# Patient Record
Sex: MALE | Race: WHITE | NOT HISPANIC OR LATINO | URBAN - METROPOLITAN AREA
[De-identification: names, ages, dates, MRNs, and addresses within clinical notes are randomized per-mention and may not be internally consistent; named-entity substitution may affect disease eponyms.]

---

## 2023-03-13 DIAGNOSIS — N52.9 ERECTILE DYSFUNCTION, UNSPECIFIED ERECTILE DYSFUNCTION TYPE: Primary | ICD-10-CM

## 2023-03-13 RX ORDER — SILDENAFIL 100 MG/1
TABLET, FILM COATED ORAL
COMMUNITY
Start: 2019-02-21 | End: 2023-11-17 | Stop reason: WASHOUT

## 2023-03-13 RX ORDER — ASPIRIN 81 MG/1
1 TABLET ORAL DAILY
COMMUNITY

## 2023-03-13 RX ORDER — ROSUVASTATIN CALCIUM 40 MG/1
1 TABLET, COATED ORAL DAILY
COMMUNITY
Start: 2020-01-13 | End: 2023-09-13

## 2023-03-13 RX ORDER — SILDENAFIL 100 MG/1
100 TABLET, FILM COATED ORAL DAILY PRN
Qty: 10 TABLET | Refills: 3 | Status: CANCELLED | OUTPATIENT
Start: 2023-03-13 | End: 2023-04-12

## 2023-03-13 RX ORDER — LISINOPRIL 5 MG/1
1 TABLET ORAL DAILY
COMMUNITY
Start: 2017-10-24 | End: 2023-09-13

## 2023-03-13 RX ORDER — PREDNISONE 20 MG/1
1 TABLET ORAL DAILY
COMMUNITY
Start: 2022-12-01 | End: 2023-11-17 | Stop reason: WASHOUT

## 2023-03-13 RX ORDER — OMEPRAZOLE 40 MG/1
1 CAPSULE, DELAYED RELEASE ORAL DAILY PRN
COMMUNITY
Start: 2017-10-24 | End: 2023-09-13

## 2023-03-14 RX ORDER — SILDENAFIL 100 MG/1
TABLET, FILM COATED ORAL
Qty: 30 TABLET | Refills: 0 | Status: SHIPPED | OUTPATIENT
Start: 2023-03-14 | End: 2023-11-21

## 2023-09-12 DIAGNOSIS — I10 BENIGN ESSENTIAL HYPERTENSION: Primary | ICD-10-CM

## 2023-09-13 DIAGNOSIS — E78.5 HYPERLIPIDEMIA, UNSPECIFIED HYPERLIPIDEMIA TYPE: ICD-10-CM

## 2023-09-13 DIAGNOSIS — K21.9 GASTROESOPHAGEAL REFLUX DISEASE WITHOUT ESOPHAGITIS: Primary | ICD-10-CM

## 2023-09-13 RX ORDER — OMEPRAZOLE 40 MG/1
40 CAPSULE, DELAYED RELEASE ORAL DAILY PRN
Qty: 100 CAPSULE | Refills: 2 | Status: SHIPPED | OUTPATIENT
Start: 2023-09-13 | End: 2024-05-06 | Stop reason: WASHOUT

## 2023-09-13 RX ORDER — LISINOPRIL 5 MG/1
5 TABLET ORAL DAILY
Qty: 100 TABLET | Refills: 2 | Status: SHIPPED | OUTPATIENT
Start: 2023-09-13

## 2023-09-13 RX ORDER — ROSUVASTATIN CALCIUM 40 MG/1
40 TABLET, COATED ORAL DAILY
Qty: 100 TABLET | Refills: 2 | Status: SHIPPED | OUTPATIENT
Start: 2023-09-13 | End: 2024-05-06 | Stop reason: SDDI

## 2023-11-17 ENCOUNTER — OFFICE VISIT (OUTPATIENT)
Dept: PRIMARY CARE | Facility: CLINIC | Age: 68
End: 2023-11-17
Payer: MEDICARE

## 2023-11-17 VITALS
DIASTOLIC BLOOD PRESSURE: 82 MMHG | SYSTOLIC BLOOD PRESSURE: 118 MMHG | BODY MASS INDEX: 31.7 KG/M2 | HEART RATE: 67 BPM | WEIGHT: 214 LBS | HEIGHT: 69 IN | OXYGEN SATURATION: 97 % | TEMPERATURE: 97.8 F

## 2023-11-17 DIAGNOSIS — I49.9 IRREGULAR HEARTBEAT: ICD-10-CM

## 2023-11-17 DIAGNOSIS — J02.9 PHARYNGITIS, UNSPECIFIED ETIOLOGY: ICD-10-CM

## 2023-11-17 DIAGNOSIS — N40.0 BENIGN PROSTATIC HYPERPLASIA WITHOUT LOWER URINARY TRACT SYMPTOMS: ICD-10-CM

## 2023-11-17 DIAGNOSIS — I10 HYPERTENSION, UNSPECIFIED TYPE: Primary | ICD-10-CM

## 2023-11-17 DIAGNOSIS — E78.5 HYPERLIPIDEMIA, UNSPECIFIED HYPERLIPIDEMIA TYPE: ICD-10-CM

## 2023-11-17 LAB
ALBUMIN SERPL BCP-MCNC: 4.6 G/DL (ref 3.4–5)
ALP SERPL-CCNC: 58 U/L (ref 33–136)
ALT SERPL W P-5'-P-CCNC: 36 U/L (ref 10–52)
ANION GAP SERPL CALC-SCNC: 14 MMOL/L (ref 10–20)
AST SERPL W P-5'-P-CCNC: 21 U/L (ref 9–39)
BASOPHILS # BLD AUTO: 0.02 X10*3/UL (ref 0–0.1)
BASOPHILS NFR BLD AUTO: 0.3 %
BILIRUB SERPL-MCNC: 0.8 MG/DL (ref 0–1.2)
BUN SERPL-MCNC: 20 MG/DL (ref 6–23)
CALCIUM SERPL-MCNC: 9.6 MG/DL (ref 8.6–10.6)
CHLORIDE SERPL-SCNC: 103 MMOL/L (ref 98–107)
CHOLEST SERPL-MCNC: 170 MG/DL (ref 0–199)
CHOLESTEROL/HDL RATIO: 3.9
CO2 SERPL-SCNC: 29 MMOL/L (ref 21–32)
CREAT SERPL-MCNC: 1.12 MG/DL (ref 0.5–1.3)
EOSINOPHIL # BLD AUTO: 0.13 X10*3/UL (ref 0–0.7)
EOSINOPHIL NFR BLD AUTO: 2.2 %
ERYTHROCYTE [DISTWIDTH] IN BLOOD BY AUTOMATED COUNT: 13.5 % (ref 11.5–14.5)
GFR SERPL CREATININE-BSD FRML MDRD: 71 ML/MIN/1.73M*2
GLUCOSE SERPL-MCNC: 89 MG/DL (ref 74–99)
HCT VFR BLD AUTO: 45.8 % (ref 41–52)
HDLC SERPL-MCNC: 44 MG/DL
HGB BLD-MCNC: 14.9 G/DL (ref 13.5–17.5)
IMM GRANULOCYTES # BLD AUTO: 0.02 X10*3/UL (ref 0–0.7)
IMM GRANULOCYTES NFR BLD AUTO: 0.3 % (ref 0–0.9)
LDLC SERPL CALC-MCNC: 87 MG/DL
LYMPHOCYTES # BLD AUTO: 1.34 X10*3/UL (ref 1.2–4.8)
LYMPHOCYTES NFR BLD AUTO: 22.3 %
MCH RBC QN AUTO: 29.4 PG (ref 26–34)
MCHC RBC AUTO-ENTMCNC: 32.5 G/DL (ref 32–36)
MCV RBC AUTO: 90 FL (ref 80–100)
MONOCYTES # BLD AUTO: 0.44 X10*3/UL (ref 0.1–1)
MONOCYTES NFR BLD AUTO: 7.3 %
NEUTROPHILS # BLD AUTO: 4.07 X10*3/UL (ref 1.2–7.7)
NEUTROPHILS NFR BLD AUTO: 67.6 %
NON HDL CHOLESTEROL: 126 MG/DL (ref 0–149)
NRBC BLD-RTO: 0 /100 WBCS (ref 0–0)
PLATELET # BLD AUTO: 214 X10*3/UL (ref 150–450)
POTASSIUM SERPL-SCNC: 4.8 MMOL/L (ref 3.5–5.3)
PROT SERPL-MCNC: 7.1 G/DL (ref 6.4–8.2)
PSA SERPL-MCNC: 1.22 NG/ML
RBC # BLD AUTO: 5.07 X10*6/UL (ref 4.5–5.9)
SODIUM SERPL-SCNC: 141 MMOL/L (ref 136–145)
T4 SERPL-MCNC: 8.1 UG/DL (ref 4.5–11.1)
TRIGL SERPL-MCNC: 196 MG/DL (ref 0–149)
TSH SERPL-ACNC: 1.1 MIU/L (ref 0.44–3.98)
VLDL: 39 MG/DL (ref 0–40)
WBC # BLD AUTO: 6 X10*3/UL (ref 4.4–11.3)

## 2023-11-17 PROCEDURE — 84436 ASSAY OF TOTAL THYROXINE: CPT

## 2023-11-17 PROCEDURE — 80061 LIPID PANEL: CPT

## 2023-11-17 PROCEDURE — 1126F AMNT PAIN NOTED NONE PRSNT: CPT | Performed by: FAMILY MEDICINE

## 2023-11-17 PROCEDURE — 36415 COLL VENOUS BLD VENIPUNCTURE: CPT

## 2023-11-17 PROCEDURE — 99214 OFFICE O/P EST MOD 30 MIN: CPT | Performed by: FAMILY MEDICINE

## 2023-11-17 PROCEDURE — 3074F SYST BP LT 130 MM HG: CPT | Performed by: FAMILY MEDICINE

## 2023-11-17 PROCEDURE — 84153 ASSAY OF PSA TOTAL: CPT

## 2023-11-17 PROCEDURE — 3079F DIAST BP 80-89 MM HG: CPT | Performed by: FAMILY MEDICINE

## 2023-11-17 PROCEDURE — 1160F RVW MEDS BY RX/DR IN RCRD: CPT | Performed by: FAMILY MEDICINE

## 2023-11-17 PROCEDURE — 84443 ASSAY THYROID STIM HORMONE: CPT

## 2023-11-17 PROCEDURE — 1159F MED LIST DOCD IN RCRD: CPT | Performed by: FAMILY MEDICINE

## 2023-11-17 PROCEDURE — 85025 COMPLETE CBC W/AUTO DIFF WBC: CPT

## 2023-11-17 PROCEDURE — 80053 COMPREHEN METABOLIC PANEL: CPT

## 2023-11-17 PROCEDURE — 1036F TOBACCO NON-USER: CPT | Performed by: FAMILY MEDICINE

## 2023-11-17 RX ORDER — NITROGLYCERIN 0.4 MG/1
0.4 TABLET SUBLINGUAL
COMMUNITY
Start: 2016-09-13

## 2023-11-17 RX ORDER — CEFUROXIME AXETIL 250 MG/1
250 TABLET ORAL 2 TIMES DAILY
Qty: 20 TABLET | Refills: 0 | Status: SHIPPED | OUTPATIENT
Start: 2023-11-17 | End: 2023-11-27

## 2023-11-17 RX ORDER — PREDNISONE 50 MG/1
TABLET ORAL
COMMUNITY
Start: 2023-11-08 | End: 2023-11-17 | Stop reason: ALTCHOICE

## 2023-11-17 RX ORDER — ATORVASTATIN CALCIUM 40 MG/1
40 TABLET, FILM COATED ORAL
COMMUNITY
Start: 2016-09-13 | End: 2023-11-17 | Stop reason: ALTCHOICE

## 2023-11-17 RX ORDER — AZITHROMYCIN 250 MG/1
TABLET, FILM COATED ORAL
COMMUNITY
Start: 2023-11-08 | End: 2023-11-17 | Stop reason: WASHOUT

## 2023-11-17 SDOH — ECONOMIC STABILITY: FOOD INSECURITY: WITHIN THE PAST 12 MONTHS, THE FOOD YOU BOUGHT JUST DIDN'T LAST AND YOU DIDN'T HAVE MONEY TO GET MORE.: NEVER TRUE

## 2023-11-17 SDOH — ECONOMIC STABILITY: TRANSPORTATION INSECURITY
IN THE PAST 12 MONTHS, HAS THE LACK OF TRANSPORTATION KEPT YOU FROM MEDICAL APPOINTMENTS OR FROM GETTING MEDICATIONS?: NO

## 2023-11-17 SDOH — ECONOMIC STABILITY: HOUSING INSECURITY
IN THE LAST 12 MONTHS, WAS THERE A TIME WHEN YOU DID NOT HAVE A STEADY PLACE TO SLEEP OR SLEPT IN A SHELTER (INCLUDING NOW)?: NO

## 2023-11-17 SDOH — ECONOMIC STABILITY: INCOME INSECURITY: IN THE LAST 12 MONTHS, WAS THERE A TIME WHEN YOU WERE NOT ABLE TO PAY THE MORTGAGE OR RENT ON TIME?: NO

## 2023-11-17 SDOH — ECONOMIC STABILITY: FOOD INSECURITY: WITHIN THE PAST 12 MONTHS, YOU WORRIED THAT YOUR FOOD WOULD RUN OUT BEFORE YOU GOT MONEY TO BUY MORE.: NEVER TRUE

## 2023-11-17 SDOH — ECONOMIC STABILITY: TRANSPORTATION INSECURITY
IN THE PAST 12 MONTHS, HAS LACK OF TRANSPORTATION KEPT YOU FROM MEETINGS, WORK, OR FROM GETTING THINGS NEEDED FOR DAILY LIVING?: NO

## 2023-11-17 ASSESSMENT — ENCOUNTER SYMPTOMS
OCCASIONAL FEELINGS OF UNSTEADINESS: 0
CARDIOVASCULAR NEGATIVE: 1
GASTROINTESTINAL NEGATIVE: 1
RESPIRATORY NEGATIVE: 1
LOSS OF SENSATION IN FEET: 0
ARTHRALGIAS: 1
PSYCHIATRIC NEGATIVE: 1
SORE THROAT: 1
ENDOCRINE NEGATIVE: 1
NEUROLOGICAL NEGATIVE: 1
CONSTITUTIONAL NEGATIVE: 1
DEPRESSION: 0

## 2023-11-17 ASSESSMENT — LIFESTYLE VARIABLES
AUDIT-C TOTAL SCORE: 0
HOW OFTEN DO YOU HAVE SIX OR MORE DRINKS ON ONE OCCASION: NEVER
SKIP TO QUESTIONS 9-10: 1
HOW MANY STANDARD DRINKS CONTAINING ALCOHOL DO YOU HAVE ON A TYPICAL DAY: PATIENT DOES NOT DRINK
HOW OFTEN DO YOU HAVE A DRINK CONTAINING ALCOHOL: NEVER

## 2023-11-17 ASSESSMENT — SOCIAL DETERMINANTS OF HEALTH (SDOH)
WITHIN THE LAST YEAR, HAVE TO BEEN RAPED OR FORCED TO HAVE ANY KIND OF SEXUAL ACTIVITY BY YOUR PARTNER OR EX-PARTNER?: NO
WITHIN THE LAST YEAR, HAVE YOU BEEN AFRAID OF YOUR PARTNER OR EX-PARTNER?: NO
IN THE PAST 12 MONTHS, HAS THE ELECTRIC, GAS, OIL, OR WATER COMPANY THREATENED TO SHUT OFF SERVICE IN YOUR HOME?: NO
WITHIN THE LAST YEAR, HAVE YOU BEEN KICKED, HIT, SLAPPED, OR OTHERWISE PHYSICALLY HURT BY YOUR PARTNER OR EX-PARTNER?: NO
WITHIN THE LAST YEAR, HAVE YOU BEEN HUMILIATED OR EMOTIONALLY ABUSED IN OTHER WAYS BY YOUR PARTNER OR EX-PARTNER?: NO
HOW HARD IS IT FOR YOU TO PAY FOR THE VERY BASICS LIKE FOOD, HOUSING, MEDICAL CARE, AND HEATING?: NOT HARD AT ALL

## 2023-11-17 ASSESSMENT — PATIENT HEALTH QUESTIONNAIRE - PHQ9
2. FEELING DOWN, DEPRESSED OR HOPELESS: NOT AT ALL
SUM OF ALL RESPONSES TO PHQ9 QUESTIONS 1 & 2: 0
1. LITTLE INTEREST OR PLEASURE IN DOING THINGS: NOT AT ALL

## 2023-11-17 ASSESSMENT — PAIN SCALES - GENERAL: PAINLEVEL: 0-NO PAIN

## 2023-11-17 NOTE — PROGRESS NOTES
"Subjective   Patient ID: Jakob Austin is a 67 y.o. male who presents for c/o  (Sore throat ).    HPI     Review of Systems   Constitutional: Negative.    HENT:  Positive for sore throat.    Respiratory: Negative.     Cardiovascular: Negative.    Gastrointestinal: Negative.    Endocrine: Negative.    Genitourinary: Negative.    Musculoskeletal:  Positive for arthralgias.   Neurological: Negative.    Psychiatric/Behavioral: Negative.         Objective   /82 (BP Location: Left arm)   Pulse 67   Temp 36.6 °C (97.8 °F) (Temporal)   Ht 1.753 m (5' 9\")   Wt 97.1 kg (214 lb)   SpO2 97%   BMI 31.60 kg/m²     Physical Exam  Vitals and nursing note reviewed.   HENT:      Right Ear: Tympanic membrane normal.      Left Ear: Tympanic membrane normal.      Mouth/Throat:      Pharynx: Oropharynx is clear.   Cardiovascular:      Rate and Rhythm: Tachycardia present. Occasional Extrasystoles are present.     Pulses: Normal pulses.      Heart sounds: Normal heart sounds.   Musculoskeletal:         General: Normal range of motion.   Neurological:      General: No focal deficit present.      Mental Status: He is oriented to person, place, and time.   Psychiatric:         Mood and Affect: Mood normal.         Behavior: Behavior normal.         Assessment/Plan patient seen here with some persistent irritation to his throat we are going to try him on some Ceftin we are drawing all his lab work here today and he is getting a cardiac calcium score done.  Let him know the results as soon as available.  He will let me know his throat does not clear up.  Problem List Items Addressed This Visit    None  Visit Diagnoses         Codes    Hypertension, unspecified type    -  Primary I10    Relevant Orders    CBC and Auto Differential    Comprehensive Metabolic Panel    CT cardiac scoring wo IV contrast    Hyperlipidemia, unspecified hyperlipidemia type     E78.5    Relevant Orders    Lipid Panel    Irregular heartbeat     I49.9    " Relevant Orders    TSH with reflex to Free T4 if abnormal    Thyroxine, Total    Benign prostatic hyperplasia without lower urinary tract symptoms     N40.0    Relevant Orders    Prostate Specific Antigen    Pharyngitis, unspecified etiology     J02.9    Relevant Medications    cefuroxime (Ceftin) 250 mg tablet

## 2023-12-18 ENCOUNTER — ANCILLARY PROCEDURE (OUTPATIENT)
Dept: RADIOLOGY | Facility: CLINIC | Age: 68
End: 2023-12-18
Payer: MEDICARE

## 2023-12-18 DIAGNOSIS — I10 HYPERTENSION, UNSPECIFIED TYPE: ICD-10-CM

## 2023-12-18 PROCEDURE — 75571 CT HRT W/O DYE W/CA TEST: CPT

## 2023-12-19 DIAGNOSIS — Z95.5 HISTORY OF HEART ARTERY STENT: ICD-10-CM

## 2023-12-19 DIAGNOSIS — R93.1 ELEVATED CORONARY ARTERY CALCIUM SCORE: ICD-10-CM

## 2023-12-19 DIAGNOSIS — I25.10 CORONARY ARTERY DISEASE INVOLVING NATIVE CORONARY ARTERY OF NATIVE HEART WITHOUT ANGINA PECTORIS: Primary | ICD-10-CM

## 2024-01-03 PROBLEM — E78.5 DYSLIPIDEMIA: Status: ACTIVE | Noted: 2024-01-03

## 2024-01-03 PROBLEM — N52.9 ED (ERECTILE DYSFUNCTION): Status: ACTIVE | Noted: 2024-01-03

## 2024-01-03 PROBLEM — K57.90 DIVERTICULOSIS: Status: ACTIVE | Noted: 2024-01-03

## 2024-01-03 PROBLEM — Z86.0101 HX OF ADENOMATOUS COLONIC POLYPS: Status: ACTIVE | Noted: 2024-01-03

## 2024-01-03 PROBLEM — I10 HYPERTENSION: Status: ACTIVE | Noted: 2024-01-03

## 2024-01-03 PROBLEM — K64.8 INTERNAL HEMORRHOID: Status: ACTIVE | Noted: 2024-01-03

## 2024-01-03 PROBLEM — I25.10 CAD (CORONARY ARTERY DISEASE): Status: ACTIVE | Noted: 2024-01-03

## 2024-01-03 PROBLEM — Z86.010 HX OF ADENOMATOUS COLONIC POLYPS: Status: ACTIVE | Noted: 2024-01-03

## 2024-01-15 ENCOUNTER — OFFICE VISIT (OUTPATIENT)
Dept: CARDIOLOGY | Facility: CLINIC | Age: 69
End: 2024-01-15
Payer: MEDICARE

## 2024-01-15 VITALS
DIASTOLIC BLOOD PRESSURE: 88 MMHG | BODY MASS INDEX: 30.72 KG/M2 | SYSTOLIC BLOOD PRESSURE: 116 MMHG | OXYGEN SATURATION: 98 % | HEART RATE: 88 BPM | WEIGHT: 208 LBS

## 2024-01-15 DIAGNOSIS — R07.9 EXERTIONAL CHEST PAIN: Primary | ICD-10-CM

## 2024-01-15 DIAGNOSIS — R93.1 ELEVATED CORONARY ARTERY CALCIUM SCORE: ICD-10-CM

## 2024-01-15 DIAGNOSIS — I25.10 ASHD (ARTERIOSCLEROTIC HEART DISEASE): ICD-10-CM

## 2024-01-15 DIAGNOSIS — R06.09 EXERTIONAL DYSPNEA: ICD-10-CM

## 2024-01-15 DIAGNOSIS — Z95.5 HISTORY OF HEART ARTERY STENT: ICD-10-CM

## 2024-01-15 PROCEDURE — 99204 OFFICE O/P NEW MOD 45 MIN: CPT | Performed by: STUDENT IN AN ORGANIZED HEALTH CARE EDUCATION/TRAINING PROGRAM

## 2024-01-15 PROCEDURE — 1160F RVW MEDS BY RX/DR IN RCRD: CPT | Performed by: STUDENT IN AN ORGANIZED HEALTH CARE EDUCATION/TRAINING PROGRAM

## 2024-01-15 PROCEDURE — 1126F AMNT PAIN NOTED NONE PRSNT: CPT | Performed by: STUDENT IN AN ORGANIZED HEALTH CARE EDUCATION/TRAINING PROGRAM

## 2024-01-15 PROCEDURE — 1036F TOBACCO NON-USER: CPT | Performed by: STUDENT IN AN ORGANIZED HEALTH CARE EDUCATION/TRAINING PROGRAM

## 2024-01-15 PROCEDURE — 3079F DIAST BP 80-89 MM HG: CPT | Performed by: STUDENT IN AN ORGANIZED HEALTH CARE EDUCATION/TRAINING PROGRAM

## 2024-01-15 PROCEDURE — 93000 ELECTROCARDIOGRAM COMPLETE: CPT | Performed by: STUDENT IN AN ORGANIZED HEALTH CARE EDUCATION/TRAINING PROGRAM

## 2024-01-15 PROCEDURE — 1159F MED LIST DOCD IN RCRD: CPT | Performed by: STUDENT IN AN ORGANIZED HEALTH CARE EDUCATION/TRAINING PROGRAM

## 2024-01-15 PROCEDURE — 3074F SYST BP LT 130 MM HG: CPT | Performed by: STUDENT IN AN ORGANIZED HEALTH CARE EDUCATION/TRAINING PROGRAM

## 2024-01-15 ASSESSMENT — ENCOUNTER SYMPTOMS
NEAR-SYNCOPE: 0
CONSTITUTIONAL NEGATIVE: 1
MUSCULOSKELETAL NEGATIVE: 1
PND: 0
PALPITATIONS: 0
HEMATOLOGIC/LYMPHATIC NEGATIVE: 1
SYNCOPE: 0
NEUROLOGICAL NEGATIVE: 1
EYES NEGATIVE: 1
DYSPNEA ON EXERTION: 1
ENDOCRINE NEGATIVE: 1
RESPIRATORY NEGATIVE: 1
ALLERGIC/IMMUNOLOGIC NEGATIVE: 1
ORTHOPNEA: 0
GASTROINTESTINAL NEGATIVE: 1
PSYCHIATRIC NEGATIVE: 1

## 2024-01-15 NOTE — PATIENT INSTRUCTIONS
For your chest pains and shortness of breath with activity; we will check a heart stress test (exercise nuclear medicine stress test)    We will see you back in clinic after your stress test.     If you have any questions; please call my nurse Margy- her contact information is below.     Thank you for your visit today. Please contact our office (via [employer] or phone) with any additional questions.     [facility] [place]    Margy, RN/Clinic Nurse for:    Dr. Tyler James    [address], [address]  [place] [address]    Phone: [phone] Press Option 5 then Option 3 to speak with the Clinic Nurse (Margy)    _____    To Reach:    Billing Questions -    [phone]  Scheduling / Rescheduling -  Option 1  Refills / Medication Requests -  Option 3  General Office / [place] -  Option 4  Results -     Option 6  Medical Records -    Option 7  Repeat Options -    Option 9

## 2024-01-15 NOTE — PROGRESS NOTES
Cardiology New Patient History and Physical    Reason for referral: ASHD per elevated coronary calcium score    HPI: Jakob Austin is a 68 y.o.  male who presents today for ASHD. Past medical history of CAD s/p FAITH to mid left circumflex (2011), HTN, DLD, PVCs, obesity, and GERD.     Patient recently underwent coronary calcium scoring given his cardiac risk factors. Coronary calcium score was elevated, referred by PCP to establish care with cardiology.     Jakob presented to cardiology clinic on 1/15/2024. Patient notes a remote history of FAITH to left circumflex in 2011.  Over past year patient notes exertional chest pain (sub-sternal, slight burning) and exertional dyspnea with moderate exertion. Symptoms are relieved by rest. Similar to symptoms he had prior to his stent in 2011.  Chest pain and dyspnea is relieved by rest after ~ 10 minutes.  Former smoker (Quit 45 year ago).     Past Medical History:   - as above    Surgical History:   - Coronary angiogram and FAITH to left circumflex (2011)  - Hernia repair as child    Family History:   Family History   Problem Relation Name Age of Onset    Hypertension Mother      Hyperlipidemia Mother         Allergies:  Patient has no known allergies.     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   HENT: Negative.     Eyes: Negative.    Cardiovascular:  Positive for chest pain and dyspnea on exertion. Negative for near-syncope, orthopnea, palpitations, paroxysmal nocturnal dyspnea and syncope.   Respiratory: Negative.     Endocrine:  Negative.    Hematologic/Lymphatic: Negative.    Skin: Negative.    Musculoskeletal: Negative.    Gastrointestinal: Negative.    Genitourinary: Negative.    Neurological: Negative.    Psychiatric/Behavioral: Negative.     Allergic/Immunologic: Negative.        Objective     Outpatient Medications:    Current Outpatient Medications:     aspirin 81 mg EC tablet, Take 1 tablet (81 mg) by mouth once daily., Disp: , Rfl:     aspirin-calcium carbonate 81 mg-300 mg calcium(777 mg) tablet, Take 1 tablet by mouth once daily as needed., Disp: , Rfl:     lisinopril 5 mg tablet, TAKE 1 TABLET BY MOUTH DAILY, Disp: 100 tablet, Rfl: 2    nitroglycerin (Nitrostat) 0.4 mg SL tablet, Place 1 tablet (0.4 mg) under the tongue., Disp: , Rfl:     omeprazole (PriLOSEC) 40 mg DR capsule, TAKE 1 CAPSULE BY MOUTH DAILY AS NEEDED, Disp: 100 capsule, Rfl: 2    rosuvastatin (Crestor) 40 mg tablet, TAKE 1 TABLET BY MOUTH DAILY, Disp: 100 tablet, Rfl: 2    sildenafil (Viagra) 100 mg tablet, TAKE 1 TABLET BY MOUTH 1 HOUR BEFORE NEEDED., Disp: 30 tablet, Rfl: 0     Last Recorded Vitals  /88 (BP Location: Right arm, Patient Position: Sitting, BP Cuff Size: Adult)   Pulse 88   Wt 94.3 kg (208 lb)   SpO2 98%   BMI 30.72 kg/m²     Physical Exam:  Physical Exam  Constitutional:       General: He is not in acute distress.     Appearance: He is obese.   HENT:      Head: Normocephalic.      Mouth/Throat:      Mouth: Mucous membranes are moist.   Eyes:      Extraocular Movements: Extraocular movements intact.      Conjunctiva/sclera: Conjunctivae normal.   Neck:      Vascular: No JVD.   Cardiovascular:      Rate and Rhythm: Normal rate and regular rhythm.      Heart sounds: No murmur heard.  Pulmonary:      Effort: Pulmonary effort is normal. No respiratory distress.      Breath sounds: Normal breath sounds.   Abdominal:      General: Bowel sounds are normal. There is no distension.      Palpations: Abdomen is soft.   Musculoskeletal:          General: No swelling.   Skin:     General: Skin is warm and dry.   Neurological:      General: No focal deficit present.      Mental Status: He is alert.      Cranial Nerves: No cranial nerve deficit.      Motor: No weakness.   Psychiatric:         Mood and Affect: Mood normal.         Behavior: Behavior normal.         Lab Review:    Lab Results   Component Value Date    GLUCOSE 89 11/17/2023    CALCIUM 9.6 11/17/2023     11/17/2023    K 4.8 11/17/2023    CO2 29 11/17/2023     11/17/2023    BUN 20 11/17/2023    CREATININE 1.12 11/17/2023       Lab Results   Component Value Date    WBC 6.0 11/17/2023    HGB 14.9 11/17/2023    HCT 45.8 11/17/2023    MCV 90 11/17/2023     11/17/2023       Lab Results   Component Value Date    CHOL 170 11/17/2023     Lab Results   Component Value Date    HDL 44.0 11/17/2023     Lab Results   Component Value Date    LDLCALC 87 11/17/2023     Lab Results   Component Value Date    TRIG 196 (H) 11/17/2023       Lab Results   Component Value Date    TSH 1.10 11/17/2023       Assessment:   68 y.o.  male who presents today for ASHD. Past medical history of CAD s/p FAITH to mid left circumflex (2011), HTN, DLD, PVCs, obesity, and GERD.     Jakob presented to cardiology clinic on 1/15/2024. Patient notes a remote history of FAITH to left circumflex in 2011.  Over past year patient notes exertional chest pain (sub-sternal, slight burning) and exertional dyspnea with moderate exertion. Symptoms are relieved by rest. Similar to symptoms he had prior to his stent in 2011.  Chest pain and dyspnea is relieved by rest after ~ 10 minutes.  Former smoker (Quit 45 year ago).     Patient with exertional chest pain and exertional dyspnea, history of known coronary disease-  recommend further evaluation with exercise nuclear medicine stress testing.    Overall Plan:  1.  Exertional dyspnea and chest pain; history of coronary disease  - Further evaluation with exercise nuclear  "medicine stress testing    2.  Atherosclerotic heart disease  - History of drug-eluting stent to the mid left circumflex in 2011  - Continue dietary and lifestyle modifications; encouraged Mediterranean style diet  - Continue aspirin  - Cardiac risk factor management as below    3.  Dyslipidemia (goal LDL less than 70 mg/dL)  - Continue rosuvastatin 40 mg daily  - Discussed dietary lifestyle modifications    4.  Hypertension (goal blood pressure less than 130/90 mmHg)  - Continue lisinopril; if not at goal would then uptitrate    5. Discussed \"red flag\" symptoms that should prompt immediate medical attention; patient verbalized understanding    Disposition: Return to cardiology clinic after above testing     Marino Scott MD        "

## 2024-01-25 ENCOUNTER — HOSPITAL ENCOUNTER (OUTPATIENT)
Dept: RADIOLOGY | Facility: CLINIC | Age: 69
Discharge: HOME | End: 2024-01-25
Payer: MEDICARE

## 2024-01-25 ENCOUNTER — HOSPITAL ENCOUNTER (OUTPATIENT)
Dept: CARDIOLOGY | Facility: CLINIC | Age: 69
Discharge: HOME | End: 2024-01-25
Payer: MEDICARE

## 2024-01-25 DIAGNOSIS — R07.89 OTHER CHEST PAIN: ICD-10-CM

## 2024-01-25 DIAGNOSIS — R07.9 EXERTIONAL CHEST PAIN: ICD-10-CM

## 2024-01-25 DIAGNOSIS — I25.10 CAD (CORONARY ARTERY DISEASE): Primary | ICD-10-CM

## 2024-01-25 DIAGNOSIS — R06.02 SHORTNESS OF BREATH: ICD-10-CM

## 2024-01-25 PROCEDURE — 78452 HT MUSCLE IMAGE SPECT MULT: CPT

## 2024-01-25 PROCEDURE — 93016 CV STRESS TEST SUPVJ ONLY: CPT | Performed by: STUDENT IN AN ORGANIZED HEALTH CARE EDUCATION/TRAINING PROGRAM

## 2024-01-25 PROCEDURE — 78452 HT MUSCLE IMAGE SPECT MULT: CPT | Performed by: INTERNAL MEDICINE

## 2024-01-30 PROBLEM — R06.09 DYSPNEA ON EXERTION: Status: ACTIVE | Noted: 2024-01-30

## 2024-01-30 PROBLEM — I49.9 IRREGULAR HEART RHYTHM: Status: ACTIVE | Noted: 2024-01-30

## 2024-01-30 PROBLEM — J02.9 PHARYNGITIS: Status: ACTIVE | Noted: 2024-01-30

## 2024-01-30 PROBLEM — N60.89 SEBACEOUS CYST OF SKIN OF BREAST: Status: ACTIVE | Noted: 2024-01-30

## 2024-01-30 PROBLEM — E66.9 OBESITY: Status: ACTIVE | Noted: 2024-01-30

## 2024-02-05 ENCOUNTER — OFFICE VISIT (OUTPATIENT)
Dept: CARDIOLOGY | Facility: CLINIC | Age: 69
End: 2024-02-05
Payer: MEDICARE

## 2024-02-05 VITALS
BODY MASS INDEX: 31.31 KG/M2 | WEIGHT: 212 LBS | HEART RATE: 94 BPM | SYSTOLIC BLOOD PRESSURE: 144 MMHG | RESPIRATION RATE: 16 BRPM | DIASTOLIC BLOOD PRESSURE: 82 MMHG | OXYGEN SATURATION: 95 %

## 2024-02-05 DIAGNOSIS — R93.1 ELEVATED CORONARY ARTERY CALCIUM SCORE: ICD-10-CM

## 2024-02-05 DIAGNOSIS — I25.10 ASHD (ARTERIOSCLEROTIC HEART DISEASE): ICD-10-CM

## 2024-02-05 DIAGNOSIS — R07.9 EXERTIONAL CHEST PAIN: ICD-10-CM

## 2024-02-05 DIAGNOSIS — R06.09 EXERTIONAL DYSPNEA: ICD-10-CM

## 2024-02-05 DIAGNOSIS — Z95.5 HISTORY OF HEART ARTERY STENT: ICD-10-CM

## 2024-02-05 PROCEDURE — 1160F RVW MEDS BY RX/DR IN RCRD: CPT | Performed by: STUDENT IN AN ORGANIZED HEALTH CARE EDUCATION/TRAINING PROGRAM

## 2024-02-05 PROCEDURE — 1159F MED LIST DOCD IN RCRD: CPT | Performed by: STUDENT IN AN ORGANIZED HEALTH CARE EDUCATION/TRAINING PROGRAM

## 2024-02-05 PROCEDURE — 3077F SYST BP >= 140 MM HG: CPT | Performed by: STUDENT IN AN ORGANIZED HEALTH CARE EDUCATION/TRAINING PROGRAM

## 2024-02-05 PROCEDURE — 3079F DIAST BP 80-89 MM HG: CPT | Performed by: STUDENT IN AN ORGANIZED HEALTH CARE EDUCATION/TRAINING PROGRAM

## 2024-02-05 PROCEDURE — 99212 OFFICE O/P EST SF 10 MIN: CPT | Performed by: STUDENT IN AN ORGANIZED HEALTH CARE EDUCATION/TRAINING PROGRAM

## 2024-02-05 PROCEDURE — 1036F TOBACCO NON-USER: CPT | Performed by: STUDENT IN AN ORGANIZED HEALTH CARE EDUCATION/TRAINING PROGRAM

## 2024-02-05 PROCEDURE — 1126F AMNT PAIN NOTED NONE PRSNT: CPT | Performed by: STUDENT IN AN ORGANIZED HEALTH CARE EDUCATION/TRAINING PROGRAM

## 2024-02-05 ASSESSMENT — ENCOUNTER SYMPTOMS
NEUROLOGICAL NEGATIVE: 1
SYNCOPE: 0
ORTHOPNEA: 0
NEAR-SYNCOPE: 0
EYES NEGATIVE: 1
CONSTITUTIONAL NEGATIVE: 1
ENDOCRINE NEGATIVE: 1
HEMATOLOGIC/LYMPHATIC NEGATIVE: 1
PND: 0
PALPITATIONS: 0
DYSPNEA ON EXERTION: 1
GASTROINTESTINAL NEGATIVE: 1
RESPIRATORY NEGATIVE: 1
ALLERGIC/IMMUNOLOGIC NEGATIVE: 1
MUSCULOSKELETAL NEGATIVE: 1
PSYCHIATRIC NEGATIVE: 1

## 2024-02-05 NOTE — PROGRESS NOTES
Cardiology Outpatient follow-up visit    Reason for visit: ASHD per elevated coronary calcium score    HPI: Jakob Austin is a 68 y.o.  male who presents today for a follow-up for ASHD. Past medical history of CAD s/p FAITH to mid left circumflex (2011), HTN, DLD, PVCs, obesity, and GERD.     Patient recently underwent coronary calcium scoring given his cardiac risk factors. Coronary calcium score was elevated, referred by PCP to establish care with cardiology.     Jakob presented to cardiology clinic on 1/15/2024. Patient notes a remote history of FAITH to left circumflex in 2011.  Over past year patient notes exertional chest pain (sub-sternal, slight burning) and exertional dyspnea with moderate exertion. Symptoms are relieved by rest. Similar to symptoms he had prior to his stent in 2011.  Chest pain and dyspnea is relieved by rest after ~ 10 minutes.  Former smoker (Quit 45 year ago).     Jakob presented to cardiology clinic 2/5/2024.  No new symptoms at this time.  Nuclear medicine stress testing showed prior area of scarring; no reversible ischemia.      Past Medical History:   - as above    Surgical History:   - Coronary angiogram and FAITH to left circumflex (2011)  - Hernia repair as child    Family History:   Family History   Problem Relation Name Age of Onset    Hypertension Mother      Hyperlipidemia Mother         Allergies:  Patient has no known allergies.     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 Abnormal exercise Myoview stress test with evidence for a small  prior infero posterior wall myocardial infarction without residual  frank-infarct ischemia.      2. Wall motion abnormalities as described above with an exercise  ejection fraction 47 %.    ____    CT cardiac scoring (12/18/2023)  LM 0,  .72.,  LCx  9.81,  .14,  Total 640.67    Lipid Panel (11/17/2023)- total 170, HDL 44, LDL 87, triglycerides 196 mg/dl    ECG (8/2019)- sinus rhythm; normal ECG    Review of Systems:  Review of Systems   Constitutional: Negative.   HENT: Negative.     Eyes: Negative.    Cardiovascular:  Positive for dyspnea on exertion. Negative for chest pain, near-syncope, orthopnea, palpitations, paroxysmal nocturnal dyspnea and syncope.   Respiratory: Negative.     Endocrine: Negative.    Hematologic/Lymphatic: Negative.    Skin: Negative.    Musculoskeletal: Negative.    Gastrointestinal: Negative.    Genitourinary: Negative.    Neurological: Negative.    Psychiatric/Behavioral: Negative.     Allergic/Immunologic: Negative.        Objective     Outpatient Medications:    Current Outpatient Medications:     aspirin 81 mg EC tablet, Take 1 tablet (81 mg) by mouth once daily., Disp: , Rfl:     lisinopril 5 mg tablet, TAKE 1 TABLET BY MOUTH DAILY, Disp: 100 tablet, Rfl: 2    nitroglycerin (Nitrostat) 0.4 mg SL tablet, Place 1 tablet (0.4 mg) under the tongue., Disp: , Rfl:     omeprazole (PriLOSEC) 40 mg DR capsule, TAKE 1 CAPSULE BY MOUTH DAILY AS NEEDED, Disp: 100 capsule, Rfl: 2    rosuvastatin (Crestor) 40 mg tablet, TAKE 1 TABLET BY MOUTH DAILY, Disp: 100 tablet, Rfl: 2    sildenafil (Viagra) 100 mg tablet, TAKE 1 TABLET BY MOUTH 1 HOUR BEFORE NEEDED., Disp: 30 tablet, Rfl: 0    aspirin-calcium carbonate 81 mg-300 mg calcium(777 mg) tablet, Take 1 tablet by mouth once daily as needed., Disp: , Rfl:      Last Recorded Vitals  /82 (BP Location: Right arm, Patient Position: Sitting)   Pulse 94   Resp 16   Wt 96.2 kg (212 lb)   SpO2 95%   BMI 31.31 kg/m²     Physical Exam:  Physical Exam  Constitutional:       General: He is not in acute distress.     Appearance: He is obese.   HENT:      Head: Normocephalic.      Mouth/Throat:      Mouth: Mucous membranes are moist.   Eyes:      Extraocular Movements: Extraocular movements  intact.      Conjunctiva/sclera: Conjunctivae normal.   Neck:      Vascular: No JVD.   Cardiovascular:      Rate and Rhythm: Normal rate and regular rhythm.      Heart sounds: No murmur heard.  Pulmonary:      Effort: Pulmonary effort is normal. No respiratory distress.      Breath sounds: Normal breath sounds.   Abdominal:      General: There is no distension.   Musculoskeletal:         General: No swelling.   Skin:     General: Skin is warm and dry.   Neurological:      General: No focal deficit present.      Mental Status: He is alert.      Cranial Nerves: No cranial nerve deficit.      Motor: No weakness.   Psychiatric:         Mood and Affect: Mood normal.         Behavior: Behavior normal.         Lab Review:    Lab Results   Component Value Date    GLUCOSE 89 11/17/2023    CALCIUM 9.6 11/17/2023     11/17/2023    K 4.8 11/17/2023    CO2 29 11/17/2023     11/17/2023    BUN 20 11/17/2023    CREATININE 1.12 11/17/2023       Lab Results   Component Value Date    WBC 6.0 11/17/2023    HGB 14.9 11/17/2023    HCT 45.8 11/17/2023    MCV 90 11/17/2023     11/17/2023       Lab Results   Component Value Date    CHOL 170 11/17/2023     Lab Results   Component Value Date    HDL 44.0 11/17/2023     Lab Results   Component Value Date    LDLCALC 87 11/17/2023     Lab Results   Component Value Date    TRIG 196 (H) 11/17/2023       Lab Results   Component Value Date    TSH 1.10 11/17/2023       Assessment:    68 y.o.  male who presents today for a follow-up for ASHD. Past medical history of CAD s/p FAITH to mid left circumflex (2011), HTN, DLD, PVCs, obesity, and GERD.     Jakob presented to cardiology clinic 2/5/2024.  No new symptoms at this time.  Nuclear medicine stress testing showed prior area of scarring; no reversible ischemia.  Continue cardiac management as below.     Overall Plan:  1.  Exertional dyspnea; history of coronary disease  - Exercise nuclear medicine stress testing showed  "evidence of prior scarring; no reversible ischemia; will continue medical management as below.     2.  Atherosclerotic heart disease  - History of drug-eluting stent to the mid left circumflex in 2011  - Continue dietary and lifestyle modifications; encouraged Mediterranean style diet  - Continue aspirin  - Cardiac risk factor management as below    3.  Dyslipidemia (goal LDL less than 70 mg/dL)  - Continue rosuvastatin 40 mg daily  - Discussed dietary lifestyle modifications    4.  Hypertension (goal blood pressure less than 130/90 mmHg)  - Continue lisinopril; if not at goal would then uptitrate    5. Discussed \"red flag\" symptoms that should prompt immediate medical attention; patient verbalized understanding    Disposition: Return to cardiology clinic in September 2024 or earlier if needed    Marino Scott MD        "

## 2024-02-05 NOTE — PATIENT INSTRUCTIONS
Your heart stress test showed a small area of old heart muscle scarring; no new at risk areas; we will continue with your current heart medications.     If your shortness of breath or chest pains return / worsen, we would then re-consider a heart catheterization.     We will see you back in heart clinic in ~ September 2024.     Thank you for your visit today. Please contact our office (via [employer] or phone) with any additional questions.     [facility] [place]    Margy, RN/Clinic Nurse for:    Dr. Tyler James    [address], [address]  [place] [address]    Phone: [phone] Press Option 5 then Option 3 to speak with the Clinic Nurse (Margy)    _____    To Reach:    Billing Questions -    [phone]  Scheduling / Rescheduling -  Option 1  Refills / Medication Requests -  Option 3  General Office /  -  Option 4  Results -     Option 6  Medical Records -    Option 7  Repeat Options -    Option 9

## 2024-05-06 ENCOUNTER — OFFICE VISIT (OUTPATIENT)
Dept: PRIMARY CARE | Facility: CLINIC | Age: 69
End: 2024-05-06
Payer: MEDICARE

## 2024-05-06 ENCOUNTER — LAB (OUTPATIENT)
Dept: LAB | Facility: LAB | Age: 69
End: 2024-05-06
Payer: MEDICARE

## 2024-05-06 VITALS
TEMPERATURE: 97.6 F | WEIGHT: 197.8 LBS | HEART RATE: 52 BPM | OXYGEN SATURATION: 97 % | DIASTOLIC BLOOD PRESSURE: 82 MMHG | BODY MASS INDEX: 29.3 KG/M2 | SYSTOLIC BLOOD PRESSURE: 130 MMHG | HEIGHT: 69 IN

## 2024-05-06 DIAGNOSIS — Z95.5 PRESENCE OF STENT IN LEFT CIRCUMFLEX CORONARY ARTERY: ICD-10-CM

## 2024-05-06 DIAGNOSIS — N40.0 BENIGN PROSTATIC HYPERPLASIA WITHOUT LOWER URINARY TRACT SYMPTOMS: ICD-10-CM

## 2024-05-06 DIAGNOSIS — I10 HYPERTENSION, UNSPECIFIED TYPE: ICD-10-CM

## 2024-05-06 DIAGNOSIS — Z00.00 MEDICARE ANNUAL WELLNESS VISIT, SUBSEQUENT: Primary | ICD-10-CM

## 2024-05-06 DIAGNOSIS — I25.10 CORONARY ARTERY DISEASE INVOLVING NATIVE CORONARY ARTERY OF NATIVE HEART WITHOUT ANGINA PECTORIS: ICD-10-CM

## 2024-05-06 PROCEDURE — 1126F AMNT PAIN NOTED NONE PRSNT: CPT | Performed by: FAMILY MEDICINE

## 2024-05-06 PROCEDURE — 1170F FXNL STATUS ASSESSED: CPT | Performed by: FAMILY MEDICINE

## 2024-05-06 PROCEDURE — 85025 COMPLETE CBC W/AUTO DIFF WBC: CPT

## 2024-05-06 PROCEDURE — 1036F TOBACCO NON-USER: CPT | Performed by: FAMILY MEDICINE

## 2024-05-06 PROCEDURE — G0439 PPPS, SUBSEQ VISIT: HCPCS | Performed by: FAMILY MEDICINE

## 2024-05-06 PROCEDURE — 80053 COMPREHEN METABOLIC PANEL: CPT

## 2024-05-06 PROCEDURE — 3079F DIAST BP 80-89 MM HG: CPT | Performed by: FAMILY MEDICINE

## 2024-05-06 PROCEDURE — 1159F MED LIST DOCD IN RCRD: CPT | Performed by: FAMILY MEDICINE

## 2024-05-06 PROCEDURE — 84153 ASSAY OF PSA TOTAL: CPT

## 2024-05-06 PROCEDURE — 3075F SYST BP GE 130 - 139MM HG: CPT | Performed by: FAMILY MEDICINE

## 2024-05-06 PROCEDURE — 1160F RVW MEDS BY RX/DR IN RCRD: CPT | Performed by: FAMILY MEDICINE

## 2024-05-06 PROCEDURE — 99497 ADVNCD CARE PLAN 30 MIN: CPT | Performed by: FAMILY MEDICINE

## 2024-05-06 PROCEDURE — 3008F BODY MASS INDEX DOCD: CPT | Performed by: FAMILY MEDICINE

## 2024-05-06 PROCEDURE — 80061 LIPID PANEL: CPT

## 2024-05-06 PROCEDURE — 99397 PER PM REEVAL EST PAT 65+ YR: CPT | Performed by: FAMILY MEDICINE

## 2024-05-06 PROCEDURE — 36415 COLL VENOUS BLD VENIPUNCTURE: CPT

## 2024-05-06 ASSESSMENT — ENCOUNTER SYMPTOMS
OCCASIONAL FEELINGS OF UNSTEADINESS: 0
DEPRESSION: 0
MUSCULOSKELETAL NEGATIVE: 1
GASTROINTESTINAL NEGATIVE: 1
CONSTITUTIONAL NEGATIVE: 1
PSYCHIATRIC NEGATIVE: 1
ENDOCRINE NEGATIVE: 1
NEUROLOGICAL NEGATIVE: 1
RESPIRATORY NEGATIVE: 1
CARDIOVASCULAR NEGATIVE: 1
LOSS OF SENSATION IN FEET: 0

## 2024-05-06 ASSESSMENT — ANXIETY QUESTIONNAIRES
7. FEELING AFRAID AS IF SOMETHING AWFUL MIGHT HAPPEN: NOT AT ALL
6. BECOMING EASILY ANNOYED OR IRRITABLE: NOT AT ALL
GAD7 TOTAL SCORE: 0
1. FEELING NERVOUS, ANXIOUS, OR ON EDGE: NOT AT ALL
2. NOT BEING ABLE TO STOP OR CONTROL WORRYING: NOT AT ALL
3. WORRYING TOO MUCH ABOUT DIFFERENT THINGS: NOT AT ALL
5. BEING SO RESTLESS THAT IT IS HARD TO SIT STILL: NOT AT ALL
4. TROUBLE RELAXING: NOT AT ALL

## 2024-05-06 ASSESSMENT — PAIN SCALES - GENERAL: PAINLEVEL: 0-NO PAIN

## 2024-05-06 ASSESSMENT — ACTIVITIES OF DAILY LIVING (ADL)
NEEDS ASSISTANCE WITH FOOD: INDEPENDENT
GROOMING: INDEPENDENT
PREPARING MEALS: INDEPENDENT
JUDGMENT_ADEQUATE_SAFELY_COMPLETE_DAILY_ACTIVITIES: YES
FEEDING YOURSELF: INDEPENDENT
MANAGING FINANCES: INDEPENDENT
USING TRANSPORTATION: INDEPENDENT
EATING: INDEPENDENT
STIL DRIVING: YES
DRESSING YOURSELF: INDEPENDENT
GROCERY SHOPPING: INDEPENDENT
TAKING MEDICATION: INDEPENDENT
WALKS IN HOME: INDEPENDENT
BATHING: INDEPENDENT
ADEQUATE_TO_COMPLETE_ADL: YES
PATIENT'S MEMORY ADEQUATE TO SAFELY COMPLETE DAILY ACTIVITIES?: YES
USING TELEPHONE: INDEPENDENT
DOING HOUSEWORK: INDEPENDENT
TOILETING: INDEPENDENT

## 2024-05-06 ASSESSMENT — GERIATRIC MINI NUTRITIONAL ASSESSMENT (MNA)
D HAS SUFFERED PSYCHOLOGICAL STRESS OR ACUTE DISEASE IN THE PAST 3 MONTHS?: NO
E NEUROPSYCHOLOGICAL PROBLEMS: NO PSYCHOLOGICAL PROBLEMS
C GENERAL MOBILITY: GOES OUT
B WEIGHT LOSS DURING THE LAST 3 MONTHS: NO WEIGHT LOSS
A HAS FOOD INTAKE DECLINED OVER THE PAST 3 MONTHS DUE TO LOSS OF APPETITE, DIGESTIVE PROBLEMS, CHEWING OR SWALLOWING DIFFICULTIES?: NO DECREASE IN FOOD INTAKE

## 2024-05-06 NOTE — PROGRESS NOTES
"Subjective   Patient ID: Jakob Austin is a 68 y.o. male who presents for Annual Exam (Annual medicare wellness fasting bw).    HPI     Review of Systems   Constitutional: Negative.    HENT: Negative.     Respiratory: Negative.     Cardiovascular: Negative.         Dr Scott   Gastrointestinal: Negative.    Endocrine: Negative.    Genitourinary: Negative.    Musculoskeletal: Negative.    Neurological: Negative.    Psychiatric/Behavioral: Negative.         Objective   /82 (BP Location: Left arm)   Pulse 52   Temp 36.4 °C (97.6 °F) (Temporal)   Ht 1.753 m (5' 9\")   Wt 89.7 kg (197 lb 12.8 oz)   SpO2 97%   BMI 29.21 kg/m²     Physical Exam  Vitals and nursing note reviewed.   Constitutional:       Appearance: Normal appearance.   HENT:      Right Ear: Tympanic membrane normal.      Left Ear: Tympanic membrane normal.      Mouth/Throat:      Pharynx: Oropharynx is clear.   Cardiovascular:      Rate and Rhythm: Normal rate and regular rhythm.      Pulses: Normal pulses.      Heart sounds: Normal heart sounds.   Pulmonary:      Breath sounds: Normal breath sounds.   Abdominal:      Palpations: Abdomen is soft.   Musculoskeletal:         General: Normal range of motion.   Neurological:      General: No focal deficit present.      Mental Status: He is alert and oriented to person, place, and time.   Psychiatric:         Mood and Affect: Mood normal.         Behavior: Behavior normal.         Assessment/Plan patient seen here for an annual Medicare wellness exam.  Reviewed his questionnaire he is agreeable to his responses.  We did discuss advanced directives.  He has no difficulty with depression or anxiety redrawing his lab work here today we will see him back in a year  Problem List Items Addressed This Visit             ICD-10-CM    CAD (coronary artery disease) I25.10    Relevant Orders    Lipid Panel    Benign prostatic hyperplasia N40.0    Relevant Orders    Prostate Specific Antigen    Hypertension I10 "    Relevant Orders    CBC and Auto Differential    Comprehensive Metabolic Panel    Presence of stent in left circumflex coronary artery Z95.5     Other Visit Diagnoses         Codes    Medicare annual wellness visit, subsequent    -  Primary Z00.00    BMI 29.0-29.9,adult     Z68.29

## 2024-05-06 NOTE — ACP (ADVANCE CARE PLANNING)
Confirming Previous Code Status:   Advance Care Planning Note     Discussion Date: 05/06/24   Discussion Participants: patient    The patient wishes to discuss Advance Care Planning today and the following is a brief summary of our discussion.     Patient has capacity to make their own medical decisions: Yes  Health Care Agent/Surrogate Decision Maker documented in chart: Yes    Documents on file and valid:  Advance Directive/Living Will: Yes   Health Care Power of : Yes  Other: none    Communication of Medical Status/Prognosis:   yes     Communication of Treatment Goals/Options:   yes     Treatment Decisions  Goals of Care: survival is paramount regardless of prognosis, treatment outcome, or burden   none  Follow Up Plan  no  Team Members  myself  Time Statement: Total face to face time spent on advance care planning was 16 minutes with 16 minutes spent in counseling, including the explanation.    Fabian Miller,   5/6/2024 2:23 PM

## 2024-05-07 LAB
ALBUMIN SERPL BCP-MCNC: 4.2 G/DL (ref 3.4–5)
ALP SERPL-CCNC: 54 U/L (ref 33–136)
ALT SERPL W P-5'-P-CCNC: 33 U/L (ref 10–52)
ANION GAP SERPL CALC-SCNC: 14 MMOL/L (ref 10–20)
AST SERPL W P-5'-P-CCNC: 22 U/L (ref 9–39)
BASOPHILS # BLD AUTO: 0.02 X10*3/UL (ref 0–0.1)
BASOPHILS NFR BLD AUTO: 0.4 %
BILIRUB SERPL-MCNC: 0.7 MG/DL (ref 0–1.2)
BUN SERPL-MCNC: 18 MG/DL (ref 6–23)
CALCIUM SERPL-MCNC: 9.3 MG/DL (ref 8.6–10.6)
CHLORIDE SERPL-SCNC: 105 MMOL/L (ref 98–107)
CHOLEST SERPL-MCNC: 216 MG/DL (ref 0–199)
CHOLESTEROL/HDL RATIO: 4.5
CO2 SERPL-SCNC: 27 MMOL/L (ref 21–32)
CREAT SERPL-MCNC: 0.93 MG/DL (ref 0.5–1.3)
EGFRCR SERPLBLD CKD-EPI 2021: 88 ML/MIN/1.73M*2
EOSINOPHIL # BLD AUTO: 0.13 X10*3/UL (ref 0–0.7)
EOSINOPHIL NFR BLD AUTO: 2.4 %
ERYTHROCYTE [DISTWIDTH] IN BLOOD BY AUTOMATED COUNT: 13.3 % (ref 11.5–14.5)
GLUCOSE SERPL-MCNC: 91 MG/DL (ref 74–99)
HCT VFR BLD AUTO: 44.1 % (ref 41–52)
HDLC SERPL-MCNC: 47.5 MG/DL
HGB BLD-MCNC: 14.5 G/DL (ref 13.5–17.5)
IMM GRANULOCYTES # BLD AUTO: 0.01 X10*3/UL (ref 0–0.7)
IMM GRANULOCYTES NFR BLD AUTO: 0.2 % (ref 0–0.9)
LDLC SERPL CALC-MCNC: 129 MG/DL
LYMPHOCYTES # BLD AUTO: 1.22 X10*3/UL (ref 1.2–4.8)
LYMPHOCYTES NFR BLD AUTO: 22.4 %
MCH RBC QN AUTO: 29.2 PG (ref 26–34)
MCHC RBC AUTO-ENTMCNC: 32.9 G/DL (ref 32–36)
MCV RBC AUTO: 89 FL (ref 80–100)
MONOCYTES # BLD AUTO: 0.47 X10*3/UL (ref 0.1–1)
MONOCYTES NFR BLD AUTO: 8.6 %
NEUTROPHILS # BLD AUTO: 3.59 X10*3/UL (ref 1.2–7.7)
NEUTROPHILS NFR BLD AUTO: 66 %
NON HDL CHOLESTEROL: 169 MG/DL (ref 0–149)
NRBC BLD-RTO: 0 /100 WBCS (ref 0–0)
PLATELET # BLD AUTO: 208 X10*3/UL (ref 150–450)
POTASSIUM SERPL-SCNC: 4 MMOL/L (ref 3.5–5.3)
PROT SERPL-MCNC: 6.6 G/DL (ref 6.4–8.2)
PSA SERPL-MCNC: 0.98 NG/ML
RBC # BLD AUTO: 4.96 X10*6/UL (ref 4.5–5.9)
SODIUM SERPL-SCNC: 142 MMOL/L (ref 136–145)
TRIGL SERPL-MCNC: 196 MG/DL (ref 0–149)
VLDL: 39 MG/DL (ref 0–40)
WBC # BLD AUTO: 5.4 X10*3/UL (ref 4.4–11.3)

## 2024-05-10 ENCOUNTER — APPOINTMENT (OUTPATIENT)
Dept: PRIMARY CARE | Facility: CLINIC | Age: 69
End: 2024-05-10
Payer: MEDICARE

## 2024-06-19 DIAGNOSIS — I10 BENIGN ESSENTIAL HYPERTENSION: ICD-10-CM

## 2024-06-20 RX ORDER — LISINOPRIL 5 MG/1
5 TABLET ORAL DAILY
Qty: 100 TABLET | Refills: 2 | Status: SHIPPED | OUTPATIENT
Start: 2024-06-20

## 2024-09-04 ENCOUNTER — APPOINTMENT (OUTPATIENT)
Dept: CARDIOLOGY | Facility: CLINIC | Age: 69
End: 2024-09-04
Payer: MEDICARE

## 2024-10-07 ENCOUNTER — TELEPHONE (OUTPATIENT)
Dept: PRIMARY CARE | Facility: CLINIC | Age: 69
End: 2024-10-07

## 2024-10-07 ENCOUNTER — APPOINTMENT (OUTPATIENT)
Dept: PRIMARY CARE | Facility: CLINIC | Age: 69
End: 2024-10-07
Payer: MEDICARE

## 2024-10-07 DIAGNOSIS — K90.49 MALABSORPTION DUE TO INTOLERANCE, NOT ELSEWHERE CLASSIFIED: ICD-10-CM

## 2024-10-07 DIAGNOSIS — N40.0 BENIGN PROSTATIC HYPERPLASIA WITHOUT LOWER URINARY TRACT SYMPTOMS: ICD-10-CM

## 2024-10-07 DIAGNOSIS — E78.5 DYSLIPIDEMIA: Primary | ICD-10-CM

## 2024-10-07 DIAGNOSIS — I10 HYPERTENSION, UNSPECIFIED TYPE: ICD-10-CM

## 2024-10-07 DIAGNOSIS — I25.10 CORONARY ARTERY DISEASE INVOLVING NATIVE CORONARY ARTERY OF NATIVE HEART WITHOUT ANGINA PECTORIS: Primary | ICD-10-CM

## 2024-10-07 NOTE — TELEPHONE ENCOUNTER
Pt called not realizing his appointment was today at 12pm, he states he thought it was on Wednesday. Pt was hoping he could be squeezed in some time this afternoon. Pt requested I ask Dr Miller to see what can be done.    Please advise.

## 2024-10-07 NOTE — TELEPHONE ENCOUNTER
Pt missed apt & is coming Thursday but could you please put in for bw so he  Can get it done before-wants all bw tests  ty

## 2024-10-08 ENCOUNTER — LAB (OUTPATIENT)
Dept: LAB | Facility: LAB | Age: 69
End: 2024-10-08
Payer: MEDICARE

## 2024-10-08 DIAGNOSIS — I10 HYPERTENSION, UNSPECIFIED TYPE: ICD-10-CM

## 2024-10-08 DIAGNOSIS — E78.5 DYSLIPIDEMIA: ICD-10-CM

## 2024-10-08 DIAGNOSIS — K90.49 MALABSORPTION DUE TO INTOLERANCE, NOT ELSEWHERE CLASSIFIED: ICD-10-CM

## 2024-10-08 DIAGNOSIS — I25.10 CORONARY ARTERY DISEASE INVOLVING NATIVE CORONARY ARTERY OF NATIVE HEART WITHOUT ANGINA PECTORIS: ICD-10-CM

## 2024-10-08 DIAGNOSIS — N40.0 BENIGN PROSTATIC HYPERPLASIA WITHOUT LOWER URINARY TRACT SYMPTOMS: ICD-10-CM

## 2024-10-08 LAB
25(OH)D3 SERPL-MCNC: 41 NG/ML (ref 30–100)
ALBUMIN SERPL BCP-MCNC: 4.3 G/DL (ref 3.4–5)
ALP SERPL-CCNC: 52 U/L (ref 33–136)
ALT SERPL W P-5'-P-CCNC: 24 U/L (ref 10–52)
ANION GAP SERPL CALC-SCNC: 13 MMOL/L (ref 10–20)
AST SERPL W P-5'-P-CCNC: 15 U/L (ref 9–39)
BASOPHILS # BLD AUTO: 0.03 X10*3/UL (ref 0–0.1)
BASOPHILS NFR BLD AUTO: 0.4 %
BILIRUB SERPL-MCNC: 0.6 MG/DL (ref 0–1.2)
BUN SERPL-MCNC: 24 MG/DL (ref 6–23)
CALCIUM SERPL-MCNC: 9.2 MG/DL (ref 8.6–10.6)
CHLORIDE SERPL-SCNC: 105 MMOL/L (ref 98–107)
CHOLEST SERPL-MCNC: 173 MG/DL (ref 0–199)
CHOLESTEROL/HDL RATIO: 3.5
CO2 SERPL-SCNC: 28 MMOL/L (ref 21–32)
CREAT SERPL-MCNC: 0.94 MG/DL (ref 0.5–1.3)
EGFRCR SERPLBLD CKD-EPI 2021: 87 ML/MIN/1.73M*2
EOSINOPHIL # BLD AUTO: 0.15 X10*3/UL (ref 0–0.7)
EOSINOPHIL NFR BLD AUTO: 1.8 %
ERYTHROCYTE [DISTWIDTH] IN BLOOD BY AUTOMATED COUNT: 13.5 % (ref 11.5–14.5)
GLUCOSE SERPL-MCNC: 108 MG/DL (ref 74–99)
HCT VFR BLD AUTO: 41.1 % (ref 41–52)
HDLC SERPL-MCNC: 49.7 MG/DL
HGB BLD-MCNC: 14 G/DL (ref 13.5–17.5)
IMM GRANULOCYTES # BLD AUTO: 0.04 X10*3/UL (ref 0–0.7)
IMM GRANULOCYTES NFR BLD AUTO: 0.5 % (ref 0–0.9)
LDLC SERPL CALC-MCNC: 87 MG/DL
LYMPHOCYTES # BLD AUTO: 1.52 X10*3/UL (ref 1.2–4.8)
LYMPHOCYTES NFR BLD AUTO: 17.8 %
MCH RBC QN AUTO: 30.6 PG (ref 26–34)
MCHC RBC AUTO-ENTMCNC: 34.1 G/DL (ref 32–36)
MCV RBC AUTO: 90 FL (ref 80–100)
MONOCYTES # BLD AUTO: 0.62 X10*3/UL (ref 0.1–1)
MONOCYTES NFR BLD AUTO: 7.3 %
NEUTROPHILS # BLD AUTO: 6.16 X10*3/UL (ref 1.2–7.7)
NEUTROPHILS NFR BLD AUTO: 72.2 %
NON HDL CHOLESTEROL: 123 MG/DL (ref 0–149)
NRBC BLD-RTO: 0 /100 WBCS (ref 0–0)
PLATELET # BLD AUTO: 214 X10*3/UL (ref 150–450)
POTASSIUM SERPL-SCNC: 4 MMOL/L (ref 3.5–5.3)
PROT SERPL-MCNC: 6.9 G/DL (ref 6.4–8.2)
PSA SERPL-MCNC: 1.51 NG/ML
RBC # BLD AUTO: 4.58 X10*6/UL (ref 4.5–5.9)
SODIUM SERPL-SCNC: 142 MMOL/L (ref 136–145)
TRIGL SERPL-MCNC: 181 MG/DL (ref 0–149)
VLDL: 36 MG/DL (ref 0–40)
WBC # BLD AUTO: 8.5 X10*3/UL (ref 4.4–11.3)

## 2024-10-08 PROCEDURE — 82306 VITAMIN D 25 HYDROXY: CPT

## 2024-10-08 PROCEDURE — 85025 COMPLETE CBC W/AUTO DIFF WBC: CPT

## 2024-10-08 PROCEDURE — 80061 LIPID PANEL: CPT

## 2024-10-08 PROCEDURE — 36415 COLL VENOUS BLD VENIPUNCTURE: CPT

## 2024-10-08 PROCEDURE — 84153 ASSAY OF PSA TOTAL: CPT

## 2024-10-08 PROCEDURE — 80053 COMPREHEN METABOLIC PANEL: CPT

## 2024-10-10 ENCOUNTER — OFFICE VISIT (OUTPATIENT)
Dept: CARDIOLOGY | Facility: CLINIC | Age: 69
End: 2024-10-10
Payer: MEDICARE

## 2024-10-10 ENCOUNTER — OFFICE VISIT (OUTPATIENT)
Dept: PRIMARY CARE | Facility: CLINIC | Age: 69
End: 2024-10-10
Payer: MEDICARE

## 2024-10-10 VITALS
DIASTOLIC BLOOD PRESSURE: 78 MMHG | BODY MASS INDEX: 31.37 KG/M2 | HEIGHT: 69 IN | WEIGHT: 211.8 LBS | TEMPERATURE: 97.8 F | HEART RATE: 102 BPM | OXYGEN SATURATION: 92 % | SYSTOLIC BLOOD PRESSURE: 136 MMHG

## 2024-10-10 VITALS
DIASTOLIC BLOOD PRESSURE: 95 MMHG | BODY MASS INDEX: 30.86 KG/M2 | OXYGEN SATURATION: 97 % | SYSTOLIC BLOOD PRESSURE: 162 MMHG | HEART RATE: 90 BPM | WEIGHT: 209 LBS

## 2024-10-10 DIAGNOSIS — E78.5 DYSLIPIDEMIA: ICD-10-CM

## 2024-10-10 DIAGNOSIS — I49.3 PVC'S (PREMATURE VENTRICULAR CONTRACTIONS): ICD-10-CM

## 2024-10-10 DIAGNOSIS — N40.0 BENIGN PROSTATIC HYPERPLASIA WITHOUT LOWER URINARY TRACT SYMPTOMS: ICD-10-CM

## 2024-10-10 DIAGNOSIS — R06.09 DYSPNEA ON EXERTION: ICD-10-CM

## 2024-10-10 DIAGNOSIS — I25.10 CORONARY ARTERY DISEASE INVOLVING NATIVE CORONARY ARTERY OF NATIVE HEART WITHOUT ANGINA PECTORIS: Primary | ICD-10-CM

## 2024-10-10 DIAGNOSIS — I10 HYPERTENSION, UNSPECIFIED TYPE: ICD-10-CM

## 2024-10-10 DIAGNOSIS — K40.90 LEFT INGUINAL HERNIA: ICD-10-CM

## 2024-10-10 DIAGNOSIS — J40 BRONCHITIS: ICD-10-CM

## 2024-10-10 DIAGNOSIS — Z95.5 PRESENCE OF STENT IN LEFT CIRCUMFLEX CORONARY ARTERY: ICD-10-CM

## 2024-10-10 DIAGNOSIS — Z00.00 MEDICARE ANNUAL WELLNESS VISIT, SUBSEQUENT: Primary | ICD-10-CM

## 2024-10-10 PROCEDURE — 99213 OFFICE O/P EST LOW 20 MIN: CPT | Performed by: STUDENT IN AN ORGANIZED HEALTH CARE EDUCATION/TRAINING PROGRAM

## 2024-10-10 PROCEDURE — 1170F FXNL STATUS ASSESSED: CPT | Performed by: FAMILY MEDICINE

## 2024-10-10 PROCEDURE — 1159F MED LIST DOCD IN RCRD: CPT | Performed by: FAMILY MEDICINE

## 2024-10-10 PROCEDURE — 1036F TOBACCO NON-USER: CPT | Performed by: FAMILY MEDICINE

## 2024-10-10 PROCEDURE — 93000 ELECTROCARDIOGRAM COMPLETE: CPT | Performed by: FAMILY MEDICINE

## 2024-10-10 PROCEDURE — 3078F DIAST BP <80 MM HG: CPT | Performed by: FAMILY MEDICINE

## 2024-10-10 PROCEDURE — 1159F MED LIST DOCD IN RCRD: CPT | Performed by: STUDENT IN AN ORGANIZED HEALTH CARE EDUCATION/TRAINING PROGRAM

## 2024-10-10 PROCEDURE — 99397 PER PM REEVAL EST PAT 65+ YR: CPT | Performed by: FAMILY MEDICINE

## 2024-10-10 PROCEDURE — 3008F BODY MASS INDEX DOCD: CPT | Performed by: FAMILY MEDICINE

## 2024-10-10 PROCEDURE — 1036F TOBACCO NON-USER: CPT | Performed by: STUDENT IN AN ORGANIZED HEALTH CARE EDUCATION/TRAINING PROGRAM

## 2024-10-10 PROCEDURE — 1124F ACP DISCUSS-NO DSCNMKR DOCD: CPT | Performed by: FAMILY MEDICINE

## 2024-10-10 PROCEDURE — 3077F SYST BP >= 140 MM HG: CPT | Performed by: STUDENT IN AN ORGANIZED HEALTH CARE EDUCATION/TRAINING PROGRAM

## 2024-10-10 PROCEDURE — 99497 ADVNCD CARE PLAN 30 MIN: CPT | Performed by: FAMILY MEDICINE

## 2024-10-10 PROCEDURE — 1125F AMNT PAIN NOTED PAIN PRSNT: CPT | Performed by: FAMILY MEDICINE

## 2024-10-10 PROCEDURE — 3075F SYST BP GE 130 - 139MM HG: CPT | Performed by: FAMILY MEDICINE

## 2024-10-10 PROCEDURE — 1160F RVW MEDS BY RX/DR IN RCRD: CPT | Performed by: FAMILY MEDICINE

## 2024-10-10 PROCEDURE — 3080F DIAST BP >= 90 MM HG: CPT | Performed by: STUDENT IN AN ORGANIZED HEALTH CARE EDUCATION/TRAINING PROGRAM

## 2024-10-10 RX ORDER — AZITHROMYCIN 250 MG/1
TABLET, FILM COATED ORAL
Qty: 6 TABLET | Refills: 0 | Status: SHIPPED | OUTPATIENT
Start: 2024-10-10 | End: 2024-10-15

## 2024-10-10 RX ORDER — METOPROLOL SUCCINATE 25 MG/1
25 TABLET, EXTENDED RELEASE ORAL DAILY
Qty: 90 TABLET | Refills: 3 | Status: SHIPPED | OUTPATIENT
Start: 2024-10-10 | End: 2025-10-10

## 2024-10-10 RX ORDER — ATORVASTATIN CALCIUM 40 MG/1
40 TABLET, FILM COATED ORAL DAILY
COMMUNITY

## 2024-10-10 ASSESSMENT — ANXIETY QUESTIONNAIRES
2. NOT BEING ABLE TO STOP OR CONTROL WORRYING: NOT AT ALL
3. WORRYING TOO MUCH ABOUT DIFFERENT THINGS: NOT AT ALL
GAD7 TOTAL SCORE: 0
4. TROUBLE RELAXING: NOT AT ALL
7. FEELING AFRAID AS IF SOMETHING AWFUL MIGHT HAPPEN: NOT AT ALL
6. BECOMING EASILY ANNOYED OR IRRITABLE: NOT AT ALL
5. BEING SO RESTLESS THAT IT IS HARD TO SIT STILL: NOT AT ALL
1. FEELING NERVOUS, ANXIOUS, OR ON EDGE: NOT AT ALL

## 2024-10-10 ASSESSMENT — ENCOUNTER SYMPTOMS
HEMATOLOGIC/LYMPHATIC NEGATIVE: 1
ARTHRALGIAS: 1
ORTHOPNEA: 0
ENDOCRINE NEGATIVE: 1
COUGH: 1
NEAR-SYNCOPE: 0
SYNCOPE: 0
LOSS OF SENSATION IN FEET: 0
RESPIRATORY NEGATIVE: 1
OCCASIONAL FEELINGS OF UNSTEADINESS: 0
DEPRESSION: 0
GASTROINTESTINAL NEGATIVE: 1
DYSPNEA ON EXERTION: 1
ALLERGIC/IMMUNOLOGIC NEGATIVE: 1
NEUROLOGICAL NEGATIVE: 1
PSYCHIATRIC NEGATIVE: 1
CARDIOVASCULAR NEGATIVE: 1
NEUROLOGICAL NEGATIVE: 1
PND: 0
MUSCULOSKELETAL NEGATIVE: 1
EYES NEGATIVE: 1
CONSTITUTIONAL NEGATIVE: 1
ENDOCRINE NEGATIVE: 1
CONSTITUTIONAL NEGATIVE: 1
GASTROINTESTINAL NEGATIVE: 1
PALPITATIONS: 1
PSYCHIATRIC NEGATIVE: 1

## 2024-10-10 ASSESSMENT — ACTIVITIES OF DAILY LIVING (ADL)
TAKING MEDICATION: INDEPENDENT
EATING: INDEPENDENT
NEEDS ASSISTANCE WITH FOOD: INDEPENDENT
BATHING: INDEPENDENT
USING TELEPHONE: INDEPENDENT
WALKS IN HOME: INDEPENDENT
PREPARING MEALS: INDEPENDENT
FEEDING: INDEPENDENT
DRESSING YOURSELF: INDEPENDENT
DRESSING: INDEPENDENT
JUDGMENT_ADEQUATE_SAFELY_COMPLETE_DAILY_ACTIVITIES: YES
FEEDING YOURSELF: INDEPENDENT
JUDGMENT_ADEQUATE_SAFELY_COMPLETE_DAILY_ACTIVITIES: YES
TOILETING: INDEPENDENT
GROCERY SHOPPING: INDEPENDENT
STIL DRIVING: YES
MANAGING FINANCES: INDEPENDENT
BATHING: INDEPENDENT
TOILETING: INDEPENDENT
USING TRANSPORTATION: INDEPENDENT
ADEQUATE_TO_COMPLETE_ADL: YES
DOING HOUSEWORK: INDEPENDENT
ADEQUATE_TO_COMPLETE_ADL: YES
PATIENT'S MEMORY ADEQUATE TO SAFELY COMPLETE DAILY ACTIVITIES?: YES
GROOMING: INDEPENDENT

## 2024-10-10 ASSESSMENT — GERIATRIC MINI NUTRITIONAL ASSESSMENT (MNA)
C GENERAL MOBILITY: GOES OUT
E NEUROPSYCHOLOGICAL PROBLEMS: NO PSYCHOLOGICAL PROBLEMS
A HAS FOOD INTAKE DECLINED OVER THE PAST 3 MONTHS DUE TO LOSS OF APPETITE, DIGESTIVE PROBLEMS, CHEWING OR SWALLOWING DIFFICULTIES?: NO DECREASE IN FOOD INTAKE
D HAS SUFFERED PSYCHOLOGICAL STRESS OR ACUTE DISEASE IN THE PAST 3 MONTHS?: NO
B WEIGHT LOSS DURING THE LAST 3 MONTHS: NO WEIGHT LOSS

## 2024-10-10 ASSESSMENT — PAIN SCALES - GENERAL: PAINLEVEL: 2

## 2024-10-10 NOTE — PROGRESS NOTES
"Subjective   Patient ID: Jakob Austin is a 68 y.o. male who presents for Annual Exam (Annual cpe not fasting ).    HPI     Review of Systems   Constitutional: Negative.    HENT: Negative.     Respiratory:  Positive for cough.    Cardiovascular: Negative.         Dr Scott   Gastrointestinal: Negative.    Endocrine: Negative.    Genitourinary: Negative.    Musculoskeletal:  Positive for arthralgias.   Neurological: Negative.    Psychiatric/Behavioral: Negative.         Objective   /78 (BP Location: Left arm)   Pulse 102   Temp 36.6 °C (97.8 °F) (Temporal)   Ht 1.753 m (5' 9\")   Wt 96.1 kg (211 lb 12.8 oz)   SpO2 92%   BMI 31.28 kg/m²     Physical Exam  Vitals and nursing note reviewed.   Constitutional:       Appearance: Normal appearance.   HENT:      Right Ear: Tympanic membrane normal.      Left Ear: Tympanic membrane normal.      Mouth/Throat:      Pharynx: Oropharynx is clear.   Cardiovascular:      Rate and Rhythm: Normal rate. Rhythm irregular. Frequent Extrasystoles are present.     Pulses: Normal pulses.      Heart sounds: Normal heart sounds.   Pulmonary:      Breath sounds: Normal breath sounds.   Abdominal:      Palpations: Abdomen is soft.   Genitourinary:     Comments: Left inguinal hernia reducible  Musculoskeletal:         General: Normal range of motion.   Neurological:      General: No focal deficit present.      Mental Status: He is alert and oriented to person, place, and time.   Psychiatric:         Mood and Affect: Mood normal.         Behavior: Behavior normal.         Assessment/Plan patient seen here for an annual Medicare wellness exam.  We reviewed his questionnaire he is agreeable to his responses.  We did discuss his recent lab work.  We did an EKG here in the office secondary to some irregular heartbeat it appears he just having frequent PVCs he has a follow-up with cardiology in the next hour.  Will see him back in a year.  Referred to general surgery for a left inguinal " hernia  Problem List Items Addressed This Visit             ICD-10-CM    Benign prostatic hyperplasia N40.0    Dyslipidemia E78.5    Hypertension I10    Relevant Orders    ECG 12 lead (Clinic Performed)    Presence of stent in left circumflex coronary artery Z95.5    Relevant Orders    Disability Placard     Other Visit Diagnoses         Codes    Medicare annual wellness visit, subsequent    -  Primary Z00.00    BMI 29.0-29.9,adult     Z68.29    Bronchitis     J40    Relevant Medications    azithromycin (Zithromax) 250 mg tablet    Left inguinal hernia     K40.90    Relevant Orders    Referral to General Surgery

## 2024-10-10 NOTE — ACP (ADVANCE CARE PLANNING)
Confirming Previous Code Status:   Advance Care Planning Note     Discussion Date: 10/10/24   Discussion Participants: patient    The patient wishes to discuss Advance Care Planning today and the following is a brief summary of our discussion.     Patient has capacity to make their own medical decisions: Yes  Health Care Agent/Surrogate Decision Maker documented in chart: Yes    Documents on file and valid:  Advance Directive/Living Will: Yes   Health Care Power of : Yes  Other: none    Communication of Medical Status/Prognosis:   yes     Communication of Treatment Goals/Options:   yes     Treatment Decisions  Goals of Care: survival is paramount regardless of prognosis, treatment outcome, or burden   yes  Follow Up Plan  no  Team Members  myself  Time Statement: Total face to face time spent on advance care planning was 16 minutes with 16 minutes spent in counseling, including the explanation.    Fabian Miller DO  10/10/2024 2:41 PM

## 2024-10-10 NOTE — PATIENT INSTRUCTIONS
Thank you for your visit today. Please contact our office (via MyChart or phone) with any additional questions.     [facility]    Margy, RN/Clinic Nurse for:    Dr. Tyler James    [address], [address]  [place] [address]    Phone: [phone] Press Option 5 then Option 3 to speak with the Clinic Nurse (Margy)    _____    To Reach:    Billing Questions -    [phone]  Scheduling / Rescheduling -  Option 1  Refills / Medication Requests -  Option 3  General Office / [place] -  Option 4  Results -     Option 6  Medical Records -    Option 7  Repeat Options -    Option 9

## 2024-10-10 NOTE — PROGRESS NOTES
Cardiology Outpatient follow-up visit    Reason for visit: ASHD per elevated coronary calcium score    HPI: Jakob Austin is a 68 y.o.  male who presents today for a follow-up for ASHD. Past medical history of CAD s/p FAITH to mid left circumflex (2011), HTN, DLD, PVCs, obesity, and GERD.     Patient recently underwent coronary calcium scoring given his cardiac risk factors. Coronary calcium score was elevated, referred by PCP to establish care with cardiology.     Jakob presented to cardiology clinic on 1/15/2024. Patient notes a remote history of FAITH to left circumflex in 2011.  Over past year patient notes exertional chest pain (sub-sternal, slight burning) and exertional dyspnea with moderate exertion. Symptoms are relieved by rest. Similar to symptoms he had prior to his stent in 2011.  Chest pain and dyspnea is relieved by rest after ~ 10 minutes.  Former smoker (Quit 45 year ago).     Jakob presented to cardiology clinic 2/5/2024.  No new symptoms at this time.  Nuclear medicine stress testing showed prior area of scarring; no reversible ischemia.      Jakob presented to cardiology clinic on 10/10/2024.  Recently went to [place] and had back/chest wall pain and shortly after developed URI symptoms.  Patient was recently seen in primary care clinic and prescribed Z-Sloan.  Patient presented to cardiology clinic today, EKG performed in primary care clinic showed sinus rhythm with frequent PVCs.  Patient denies any chest pain.  Patient does have intermittent dyspnea with exertion.  Prior nuclear medicine stress test showed prior scar; no reversible ischemia.  Patient with inguinal hernia and planned to undergo surgical repair in near future; given prior stress test was low-risk for ischemia recommend proceeding with surgery without additional cardiovascular testing.      Past Medical History:   - as above    Surgical History:   - Coronary angiogram and FAITH to left circumflex (2011)  - Hernia repair as  child    Family History:   Family History   Problem Relation Name Age of Onset    Hypertension Mother      Hyperlipidemia Mother         Allergies:  Patient has no known allergies.     Social History:   - Non smoker (Quit 45 year ago); social alcohol use; no illicit drug use  - Works part time- house band for [employer]     Prior Cardiovascular Testing (personally reviewed):     NM stress (1/29/2024)  1. Abnormal exercise Myoview stress test with evidence for a small  prior infero posterior wall myocardial infarction without residual  frank-infarct ischemia.      2. Wall motion abnormalities as described above with an exercise  ejection fraction 47 %.    ____    CT cardiac scoring (12/18/2023)  LM 0,  .72.,  LCx 9.81,  .14,  Total 640.67    Lipid Panel (11/17/2023)- total 170, HDL 44, LDL 87, triglycerides 196 mg/dl    ECG (8/2019)- sinus rhythm; normal ECG    Review of Systems:  Review of Systems   Constitutional: Negative.   HENT: Negative.     Eyes: Negative.    Cardiovascular:  Positive for dyspnea on exertion and palpitations. Negative for chest pain, near-syncope, orthopnea, paroxysmal nocturnal dyspnea and syncope.   Respiratory: Negative.     Endocrine: Negative.    Hematologic/Lymphatic: Negative.    Skin: Negative.    Musculoskeletal: Negative.    Gastrointestinal: Negative.    Genitourinary: Negative.    Neurological: Negative.    Psychiatric/Behavioral: Negative.     Allergic/Immunologic: Negative.        Objective     Outpatient Medications:    Current Outpatient Medications:     aspirin 81 mg EC tablet, Take 1 tablet (81 mg) by mouth once daily., Disp: , Rfl:     atorvastatin (Lipitor) 40 mg tablet, Take 1 tablet (40 mg) by mouth once daily., Disp: , Rfl:     lisinopril 5 mg tablet, TAKE 1 TABLET BY MOUTH DAILY, Disp: 100 tablet, Rfl: 2    nitroglycerin (Nitrostat) 0.4 mg SL tablet, Place 1 tablet (0.4 mg) under the tongue., Disp: , Rfl:     sildenafil (Viagra) 100 mg tablet, TAKE 1  TABLET BY MOUTH 1 HOUR BEFORE NEEDED., Disp: 30 tablet, Rfl: 0    azithromycin (Zithromax) 250 mg tablet, Take 2 tablets (500 mg) by mouth once daily for 1 day, THEN 1 tablet (250 mg) once daily for 4 days. Take 2 tabs (500 mg) by mouth today, than 1 daily for 4 days.., Disp: 6 tablet, Rfl: 0     Last Recorded Vitals  BP (!) 162/95 (BP Location: Left arm, Patient Position: Sitting)   Pulse 90   Wt 94.8 kg (209 lb)   SpO2 97%   BMI 30.86 kg/m²     Physical Exam:  Physical Exam  Constitutional:       General: He is not in acute distress.     Appearance: He is obese.   HENT:      Head: Normocephalic.      Mouth/Throat:      Mouth: Mucous membranes are moist.   Eyes:      Extraocular Movements: Extraocular movements intact.      Conjunctiva/sclera: Conjunctivae normal.   Neck:      Vascular: No carotid bruit or JVD.   Cardiovascular:      Rate and Rhythm: Normal rate and regular rhythm.      Heart sounds: No murmur heard.  Pulmonary:      Effort: Pulmonary effort is normal. No respiratory distress.      Breath sounds: Normal breath sounds.   Abdominal:      General: There is no distension.   Musculoskeletal:         General: No swelling.   Skin:     General: Skin is warm and dry.   Neurological:      General: No focal deficit present.      Mental Status: He is alert.      Cranial Nerves: No cranial nerve deficit.      Motor: No weakness.   Psychiatric:         Mood and Affect: Mood normal.         Behavior: Behavior normal.         Lab Review:    Lab Results   Component Value Date    GLUCOSE 108 (H) 10/08/2024    CALCIUM 9.2 10/08/2024     10/08/2024    K 4.0 10/08/2024    CO2 28 10/08/2024     10/08/2024    BUN 24 (H) 10/08/2024    CREATININE 0.94 10/08/2024       Lab Results   Component Value Date    WBC 8.5 10/08/2024    HGB 14.0 10/08/2024    HCT 41.1 10/08/2024    MCV 90 10/08/2024     10/08/2024       Lab Results   Component Value Date    CHOL 173 10/08/2024    CHOL 216 (H) 05/06/2024     CHOL 170 11/17/2023     Lab Results   Component Value Date    HDL 49.7 10/08/2024    HDL 47.5 05/06/2024    HDL 44.0 11/17/2023     Lab Results   Component Value Date    LDLCALC 87 10/08/2024    LDLCALC 129 (H) 05/06/2024    LDLCALC 87 11/17/2023     Lab Results   Component Value Date    TRIG 181 (H) 10/08/2024    TRIG 196 (H) 05/06/2024    TRIG 196 (H) 11/17/2023       Lab Results   Component Value Date    TSH 1.10 11/17/2023       Assessment:    68 y.o.  male who presents today for a follow-up for ASHD. Past medical history of CAD s/p FAITH to mid left circumflex (2011), HTN, DLD, PVCs, obesity, and GERD.     Jakob presented to cardiology clinic on 10/10/2024.  Recently went to [place] and had back/chest wall pain and shortly after developed URI symptoms.  Patient was recently seen in primary care clinic and prescribed Z-Sloan.  Patient presented to cardiology clinic today, EKG performed in primary care clinic showed sinus rhythm with frequent PVCs.  Patient denies any chest pain.  Patient does have intermittent dyspnea with exertion.  Prior nuclear medicine stress test showed prior scar; no reversible ischemia.  Patient with inguinal hernia and planned to undergo surgical repair in near future; given prior stress test was low-risk for ischemia recommend proceeding with surgery without additional cardiovascular testing.    Regarding frequent PVCs we will trial patient on low-dose beta-blockade with metoprolol XL 25 mg daily and uptitrate as tolerated.  If patient's exertional dyspnea accelerated would then reconsider risk/benefit of diagnostic coronary angiography and repeat heart catheterization; we will defer for now as patient is chest pain-free and patient has had no acceleration in his exertional dyspnea.    Overall Plan:  1.  Exertional dyspnea; history of coronary disease  - Exercise nuclear medicine stress testing showed evidence of prior scarring; no reversible ischemia; will continue medical  "management as below.     2.  Atherosclerotic heart disease  - History of drug-eluting stent to the mid left circumflex in 2011  - Continue dietary and lifestyle modifications; encouraged Mediterranean style diet  - Continue aspirin  - Cardiac risk factor management as below    3.  Dyslipidemia (goal LDL less than 70 mg/dL)  - Continue rosuvastatin 40 mg daily  - Discussed dietary lifestyle modifications    4.  Hypertension (goal blood pressure less than 130/90 mmHg)  - Continue lisinopril; if not at goal would then uptitrate    5.  Frequent PVCs  - Patient notes intermittent palpitations; we will initiate metoprolol XL 25 mg daily and uptitrate as tolerated    6. Discussed \"red flag\" symptoms that should prompt immediate medical attention; patient verbalized understanding    Disposition: Return to cardiology clinic in March 2025 or earlier if needed    Marino Scott MD        "

## 2024-10-22 NOTE — PROGRESS NOTES
History Of Present Illness  HPI   The patient is a 68-year-old male referred for a left inguinal hernia.  The patient complains of a month and a half history of left inguinal discomfort.  He does not recall any injury.  No prior left inguinal hernia repair.  He had repair of a right inguinal hernia as a 8-year-old.    Past medical history:  Coronary artery disease status post stent placement in 2011.  He takes a daily baby aspirin.  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    Past Medical History  He has no past medical history on file.    Surgical History  He has no past surgical history on file.     Allergies  Patient has no known allergies.    Social History  He reports that he has never smoked. He has never used smokeless tobacco. He reports that he does not drink alcohol and does not use drugs.    Family History  Family History   Problem Relation Name Age of Onset    Hypertension Mother      Hyperlipidemia Mother         Review of Systems  Review of Systems:  Constitutional:  no fever, no chills, no significant weight change  Neurological: No history of CVA or seizure disorder  Eyes: No pain, no recent visual change  ENT:  No recent hearing loss  Neck: No pain  Cardiovascular: As above.  No chest pain.  Pulmonary: No shortness of breath, no history of pulmonary disease such as pneumonia or COPD  Breast: No history of breast disease or breast mass  Gastrointestinal:   no abdominal pain, no nausea or vomiting, no constipation or diarrhea or blood in the stool.  No history of ulcers.  No liver, gallbladder or pancreas disease.  No intestinal disorder.  Genitourinary: No hematuria or dysuria, no kidney disease  Musculoskeletal:  no arthralgia, no muscle or bone pain  Integumentary:  no rash  Psychiatric:  No anxiety or depression  Endocrine:  no history of diabetes  Hematologic/Lymphatic: No easy bruising or bleeding    Last Recorded Vitals  Blood pressure (!) 167/100, pulse 86,  "temperature 36.8 °C (98.2 °F), temperature source Temporal, resp. rate 16, height 1.753 m (5' 9\"), weight 96.7 kg (213 lb 3.2 oz), SpO2 98%.    Physical Exam  Constitutional: Well-developed, well-nourished, alert and oriented, no acute distress  Skin: Warm and dry, no lesions, no rashes, no jaundice  HEENT: Normocephalic, atraumatic, EOMI, no scleral icterus, eyes have no redness or swelling or discharge, external inspection of ears and nose is normal, mucous membranes moist  Neck: Soft, nontender, no mass or adenopathy  Cardiac: Regular rate and rhythm, no murmur  Chest: Patent airway, clear to auscultation, normal breath sounds with good chest expansion, no wheezes or rales or rhonchi noted, thorax symmetric  Abdomen: Nondistended, positive bowel sounds, soft, nontender, no mass  Left inguinal hernia, reducible.  No right inguinal or femoral hernias palpable.  Rectal: Not performed  Extremities: No injury, no lower extremity edema or calf tenderness  Lymphatic: No cervical adenopathy  Musculoskeletal: Range of motion intact, no joint swelling, normal strength  Neurological: Alert and oriented x3, intact sensory and motor function, no obvious focal neurologic abnormalities, normal gait  Psychological: Appropriate mood and behavior  The patient declined a chaperone    Relevant Results  Labs from October 8, 2024.  WBC 8.5, hemoglobin 14.0, platelet 214.  Electrolytes normal, BUN 24, glucose 108    Assessment/Plan   Diagnoses and all orders for this visit:  Left inguinal hernia  -     Referral to General Surgery    68-year-old male with a symptomatic, but reducible left inguinal hernia.  Recommend left inguinal herniorrhaphy with mesh either open or robotic assisted laparoscopic.  I discussed the procedure and risks with the patient. The risks include bleeding, infection, mesh infection, recurrence, injury to surrounding structures such as nerves/blood vessels/intestine/bladder, chronic postop pain, cardiac/pulmonary " "complications.   If the mesh becomes infected it could require excision.  I discussed the alternative of hernia repair without mesh and observation.  The patient agrees to have a robotic assisted laparoscopic left inguinal herniorrhaphy with mesh with possible bilateral repair and possible open operation.  He wishes to schedule the operation for January 2025.  I discussed the risk of incarceration and strangulation.  I discussed the symptoms and told him to go to the emergency room immediately if this occurs.  Electronic consent obtained.  I told the patient to recheck his blood pressure at home and if it remains elevated to notify his primary care physician.  Request cardiac risk assessment note from his cardiologist Dr. Scott.    October 25, 2024 addendum:  I received a cardiac risk assessment note from Dr. Scott which states \"okay to proceed with proposed surgery without additional cardiovascular testing, patient is acceptable risk from cardiac standpoint.\"    Venkatesh Santos MD  "

## 2024-10-23 ENCOUNTER — APPOINTMENT (OUTPATIENT)
Dept: SURGERY | Facility: CLINIC | Age: 69
End: 2024-10-23
Payer: MEDICARE

## 2024-10-23 ENCOUNTER — PREP FOR PROCEDURE (OUTPATIENT)
Dept: SURGERY | Facility: CLINIC | Age: 69
End: 2024-10-23

## 2024-10-23 VITALS
HEIGHT: 69 IN | OXYGEN SATURATION: 98 % | TEMPERATURE: 98.2 F | HEART RATE: 86 BPM | RESPIRATION RATE: 16 BRPM | SYSTOLIC BLOOD PRESSURE: 167 MMHG | BODY MASS INDEX: 31.58 KG/M2 | WEIGHT: 213.2 LBS | DIASTOLIC BLOOD PRESSURE: 100 MMHG

## 2024-10-23 DIAGNOSIS — K40.90 LEFT INGUINAL HERNIA: Primary | ICD-10-CM

## 2024-10-23 PROCEDURE — 99204 OFFICE O/P NEW MOD 45 MIN: CPT | Performed by: SURGERY

## 2024-10-23 PROCEDURE — 1036F TOBACCO NON-USER: CPT | Performed by: SURGERY

## 2024-10-23 PROCEDURE — 1125F AMNT PAIN NOTED PAIN PRSNT: CPT | Performed by: SURGERY

## 2024-10-23 PROCEDURE — 3080F DIAST BP >= 90 MM HG: CPT | Performed by: SURGERY

## 2024-10-23 PROCEDURE — 3008F BODY MASS INDEX DOCD: CPT | Performed by: SURGERY

## 2024-10-23 PROCEDURE — 1159F MED LIST DOCD IN RCRD: CPT | Performed by: SURGERY

## 2024-10-23 PROCEDURE — 3077F SYST BP >= 140 MM HG: CPT | Performed by: SURGERY

## 2024-10-23 RX ORDER — CEFAZOLIN SODIUM 2 G/100ML
2 INJECTION, SOLUTION INTRAVENOUS ONCE
OUTPATIENT
Start: 2024-10-23 | End: 2024-10-23

## 2024-10-23 ASSESSMENT — PAIN SCALES - GENERAL: PAINLEVEL_OUTOF10: 2

## 2024-10-23 NOTE — LETTER
October 23, 2024     Fabian Miller DO  [address]  [address]  [facility] [address]    Patient: Jakob Austin   YOB: 1955   Date of Visit: 10/23/2024       Dear Dr. Fabian Miller DO:    Thank you for referring Jakob Austin to me for evaluation. Below are my notes for this consultation.  If you have questions, please do not hesitate to call me. I look forward to following your patient along with you.       Sincerely,     Venkatesh Santos MD      CC: No Recipients  ______________________________________________________________________________________    History Of Present Illness  HPI   The patient is a 68-year-old male referred for a left inguinal hernia.  The patient complains of a month and a half history of left inguinal discomfort.  He does not recall any injury.  No prior left inguinal hernia repair.  He had repair of a right inguinal hernia as a 8-year-old.    Past medical history:  Coronary artery disease status post stent placement in 2011.  He takes a daily baby aspirin.  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    Past Medical History  He has no past medical history on file.    Surgical History  He has no past surgical history on file.     Allergies  Patient has no known allergies.    Social History  He reports that he has never smoked. He has never used smokeless tobacco. He reports that he does not drink alcohol and does not use drugs.    Family History  Family History   Problem Relation Name Age of Onset   • Hypertension Mother     • Hyperlipidemia Mother         Review of Systems  Review of Systems:  Constitutional:  no fever, no chills, no significant weight change  Neurological: No history of CVA or seizure disorder  Eyes: No pain, no recent visual change  ENT:  No recent hearing loss  Neck: No pain  Cardiovascular: As above.  No chest pain.  Pulmonary: No shortness of breath, no history of pulmonary disease such as pneumonia or  "COPD  Breast: No history of breast disease or breast mass  Gastrointestinal:   no abdominal pain, no nausea or vomiting, no constipation or diarrhea or blood in the stool.  No history of ulcers.  No liver, gallbladder or pancreas disease.  No intestinal disorder.  Genitourinary: No hematuria or dysuria, no kidney disease  Musculoskeletal:  no arthralgia, no muscle or bone pain  Integumentary:  no rash  Psychiatric:  No anxiety or depression  Endocrine:  no history of diabetes  Hematologic/Lymphatic: No easy bruising or bleeding    Last Recorded Vitals  Blood pressure (!) 167/100, pulse 86, temperature 36.8 °C (98.2 °F), temperature source Temporal, resp. rate 16, height 1.753 m (5' 9\"), weight 96.7 kg (213 lb 3.2 oz), SpO2 98%.    Physical Exam  Constitutional: Well-developed, well-nourished, alert and oriented, no acute distress  Skin: Warm and dry, no lesions, no rashes, no jaundice  HEENT: Normocephalic, atraumatic, EOMI, no scleral icterus, eyes have no redness or swelling or discharge, external inspection of ears and nose is normal, mucous membranes moist  Neck: Soft, nontender, no mass or adenopathy  Cardiac: Regular rate and rhythm, no murmur  Chest: Patent airway, clear to auscultation, normal breath sounds with good chest expansion, no wheezes or rales or rhonchi noted, thorax symmetric  Abdomen: Nondistended, positive bowel sounds, soft, nontender, no mass  Left inguinal hernia, reducible.  No right inguinal or femoral hernias palpable.  Rectal: Not performed  Extremities: No injury, no lower extremity edema or calf tenderness  Lymphatic: No cervical adenopathy  Musculoskeletal: Range of motion intact, no joint swelling, normal strength  Neurological: Alert and oriented x3, intact sensory and motor function, no obvious focal neurologic abnormalities, normal gait  Psychological: Appropriate mood and behavior  The patient declined a chaperone    Relevant Results  Labs from October 8, 2024.  WBC 8.5, " hemoglobin 14.0, platelet 214.  Electrolytes normal, BUN 24, glucose 108    Assessment/Plan  Diagnoses and all orders for this visit:  Left inguinal hernia  -     Referral to General Surgery    68-year-old male with a symptomatic, but reducible left inguinal hernia.  Recommend left inguinal herniorrhaphy with mesh either open or robotic assisted laparoscopic.  I discussed the procedure and risks with the patient. The risks include bleeding, infection, mesh infection, recurrence, injury to surrounding structures such as nerves/blood vessels/intestine/bladder, chronic postop pain, cardiac/pulmonary complications.   If the mesh becomes infected it could require excision.  I discussed the alternative of hernia repair without mesh and observation.  The patient agrees to have a robotic assisted laparoscopic left inguinal herniorrhaphy with mesh with possible bilateral repair and possible open operation.  He wishes to schedule the operation for January 2025.  I discussed the risk of incarceration and strangulation.  I discussed the symptoms and told him to go to the emergency room immediately if this occurs.  Electronic consent obtained.  I told the patient to recheck his blood pressure at home and if it remains elevated to notify his primary care physician.  Request cardiac risk assessment note from his cardiologist Dr. Scott.    Venkatesh Santos MD

## 2024-10-24 ENCOUNTER — TELEPHONE (OUTPATIENT)
Dept: CARDIOLOGY | Facility: CLINIC | Age: 69
End: 2024-10-24
Payer: MEDICARE

## 2024-10-24 NOTE — TELEPHONE ENCOUNTER
Pt is scheduled for ROBOTIC ASSISTED LAPAROSCOPIC LEFT INGUINAL HERNIORRHAPHY WITH MESH/ POSSIBLE BILATERAL HERNIA REPAIR/ POSSIBLE OPEN with Dr Santos under general anesthesia on 1/16/24.    Cardiac clearance is requested.     Last Dr Scott OV was 10/10/24.

## 2024-11-21 ENCOUNTER — TELEPHONE (OUTPATIENT)
Dept: SURGERY | Facility: CLINIC | Age: 69
End: 2024-11-21
Payer: MEDICARE

## 2024-11-21 NOTE — TELEPHONE ENCOUNTER
Received a phone call from patient requesting an earlier time for his surgery as he's currently scheduled January 16,2024 and is having more pain than he had been having. I discussed with surgery scheduling and patient has been moved to January 2 for surgery. Patient is aware, surgery scheduling is aware as well.  Patient also was scheduled for a preoperative visit as well.

## 2024-12-19 NOTE — H&P (VIEW-ONLY)
"History Of Present Illness  HPI   October 23, 2024  The patient is a 68-year-old male referred for a left inguinal hernia.  The patient complains of a month and a half history of left inguinal discomfort.  He does not recall any injury.  No prior left inguinal hernia repair.  He had repair of a right inguinal hernia as a 8-year-old.    December 20, 2024  The patient reports no change in his hernia.  He does complain of a 2-month history of some intermittent mild cramping postprandial abdominal pain associate with mild nausea.  It seems to happen a couple times a week.  He has no vomiting.  No constipation or diarrhea or blood in his stool.  He has no pain at this time.  No prior abdominal operation other than his right inguinal hernia repair.  He reports having a colonoscopy about 3 years ago which he states was unremarkable.    Past medical history:  Coronary artery disease status post stent placement in 2011.  He takes a daily baby aspirin.  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     Past Medical History  He has no past medical history on file.    Surgical History  He has no past surgical history on file.     Allergies  Patient has no known allergies.    Social History  He reports that he has never smoked. He has never used smokeless tobacco. He reports that he does not drink alcohol and does not use drugs.    Family History  Family History   Problem Relation Name Age of Onset    Hypertension Mother      Hyperlipidemia Mother         Last Recorded Vitals  Blood pressure (!) 161/100, pulse 92, temperature 36.8 °C (98.3 °F), temperature source Temporal, resp. rate 18, height 1.753 m (5' 9\"), weight 98.7 kg (217 lb 9.6 oz), SpO2 100%.    Physical Exam  Constitutional: Well-developed, well-nourished, alert and oriented, no acute distress  Skin: Warm and dry, no lesions, no rashes, no jaundice  HEENT: Normocephalic, atraumatic, EOMI, no scleral icterus, eyes have no redness or " swelling or discharge, external inspection of ears and nose is normal, mucous membranes moist  Neck: Soft, nontender, no mass or adenopathy  Cardiac: Regular rate and rhythm, no murmur  Chest: Patent airway, clear to auscultation, normal breath sounds with good chest expansion, no wheezes or rales or rhonchi noted, thorax symmetric  Abdomen: Nondistended, positive bowel sounds, soft, nontender, no mass.  Tiny umbilical hernia which is easily reducible.  Left inguinal hernia, reducible.  Right inguinal scar.  No right inguinal or femoral hernias palpable.  Rectal: Not performed  Extremities: No injury, no lower extremity edema or calf tenderness  Lymphatic: No cervical adenopathy  Musculoskeletal: Range of motion intact, no joint swelling, normal strength  Neurological: Alert and oriented x3, intact sensory and motor function, no obvious focal neurologic abnormalities, normal gait  Psychological: Appropriate mood and behavior  The patient declined a chaperone.    Relevant Results  Labs from October 8, 2024.  WBC 8.5, hemoglobin 14.0, platelet 214.  Electrolytes normal, BUN 24, glucose 108        Assessment/Plan   Diagnoses and all orders for this visit:  Left inguinal hernia      68-year-old male with a symptomatic, but reducible left inguinal hernia.  Recommend left inguinal herniorrhaphy with mesh either open or robotic assisted laparoscopic.  I again discussed the procedure and risks with the patient. The risks include bleeding, infection, mesh infection, recurrence, injury to surrounding structures such as nerves/blood vessels/intestine/bladder, chronic postop pain, cardiac/pulmonary complications.   If the mesh becomes infected it could require excision.  I have discussed the alternative of hernia repair without mesh and observation.  The patient wishes to have a robotic assisted laparoscopic left inguinal herniorrhaphy with mesh with possible bilateral repair and possible open operation.      Electronic consent  "has been obtained.  I would recommend observation of the tiny asymptomatic reducible umbilical hernia.  I told the patient to recheck his blood pressure at home and if it remains elevated to notify his primary care physician.  I discussed ordering a CT scan of the abdomen and pelvis for further evaluation of his cramping discomfort which I feel is unrelated to his hernia.  The patient does not wish to have a CT scan performed at this time.  He does wish to proceed with his operation.  I received a cardiac risk assessment note from Dr. Scott which states \"okay to proceed with proposed surgery without additional cardiovascular testing, patient is acceptable risk from cardiac standpoint.\"       Venkatesh Santos MD  "

## 2024-12-19 NOTE — PROGRESS NOTES
"History Of Present Illness  HPI   October 23, 2024  The patient is a 68-year-old male referred for a left inguinal hernia.  The patient complains of a month and a half history of left inguinal discomfort.  He does not recall any injury.  No prior left inguinal hernia repair.  He had repair of a right inguinal hernia as a 8-year-old.    December 20, 2024  The patient reports no change in his hernia.  He does complain of a 2-month history of some intermittent mild cramping postprandial abdominal pain associate with mild nausea.  It seems to happen a couple times a week.  He has no vomiting.  No constipation or diarrhea or blood in his stool.  He has no pain at this time.  No prior abdominal operation other than his right inguinal hernia repair.  He reports having a colonoscopy about 3 years ago which he states was unremarkable.    Past medical history:  Coronary artery disease status post stent placement in 2011.  He takes a daily baby aspirin.  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     Past Medical History  He has no past medical history on file.    Surgical History  He has no past surgical history on file.     Allergies  Patient has no known allergies.    Social History  He reports that he has never smoked. He has never used smokeless tobacco. He reports that he does not drink alcohol and does not use drugs.    Family History  Family History   Problem Relation Name Age of Onset    Hypertension Mother      Hyperlipidemia Mother         Last Recorded Vitals  Blood pressure (!) 161/100, pulse 92, temperature 36.8 °C (98.3 °F), temperature source Temporal, resp. rate 18, height 1.753 m (5' 9\"), weight 98.7 kg (217 lb 9.6 oz), SpO2 100%.    Physical Exam  Constitutional: Well-developed, well-nourished, alert and oriented, no acute distress  Skin: Warm and dry, no lesions, no rashes, no jaundice  HEENT: Normocephalic, atraumatic, EOMI, no scleral icterus, eyes have no redness or " swelling or discharge, external inspection of ears and nose is normal, mucous membranes moist  Neck: Soft, nontender, no mass or adenopathy  Cardiac: Regular rate and rhythm, no murmur  Chest: Patent airway, clear to auscultation, normal breath sounds with good chest expansion, no wheezes or rales or rhonchi noted, thorax symmetric  Abdomen: Nondistended, positive bowel sounds, soft, nontender, no mass.  Tiny umbilical hernia which is easily reducible.  Left inguinal hernia, reducible.  Right inguinal scar.  No right inguinal or femoral hernias palpable.  Rectal: Not performed  Extremities: No injury, no lower extremity edema or calf tenderness  Lymphatic: No cervical adenopathy  Musculoskeletal: Range of motion intact, no joint swelling, normal strength  Neurological: Alert and oriented x3, intact sensory and motor function, no obvious focal neurologic abnormalities, normal gait  Psychological: Appropriate mood and behavior  The patient declined a chaperone.    Relevant Results  Labs from October 8, 2024.  WBC 8.5, hemoglobin 14.0, platelet 214.  Electrolytes normal, BUN 24, glucose 108        Assessment/Plan   Diagnoses and all orders for this visit:  Left inguinal hernia      68-year-old male with a symptomatic, but reducible left inguinal hernia.  Recommend left inguinal herniorrhaphy with mesh either open or robotic assisted laparoscopic.  I again discussed the procedure and risks with the patient. The risks include bleeding, infection, mesh infection, recurrence, injury to surrounding structures such as nerves/blood vessels/intestine/bladder, chronic postop pain, cardiac/pulmonary complications.   If the mesh becomes infected it could require excision.  I have discussed the alternative of hernia repair without mesh and observation.  The patient wishes to have a robotic assisted laparoscopic left inguinal herniorrhaphy with mesh with possible bilateral repair and possible open operation.      Electronic consent  "has been obtained.  I would recommend observation of the tiny asymptomatic reducible umbilical hernia.  I told the patient to recheck his blood pressure at home and if it remains elevated to notify his primary care physician.  I discussed ordering a CT scan of the abdomen and pelvis for further evaluation of his cramping discomfort which I feel is unrelated to his hernia.  The patient does not wish to have a CT scan performed at this time.  He does wish to proceed with his operation.  I received a cardiac risk assessment note from Dr. Scott which states \"okay to proceed with proposed surgery without additional cardiovascular testing, patient is acceptable risk from cardiac standpoint.\"       Venkatesh Santos MD  "

## 2024-12-20 ENCOUNTER — APPOINTMENT (OUTPATIENT)
Dept: SURGERY | Facility: CLINIC | Age: 69
End: 2024-12-20
Payer: MEDICARE

## 2024-12-20 VITALS
WEIGHT: 217.6 LBS | HEIGHT: 69 IN | TEMPERATURE: 98.3 F | DIASTOLIC BLOOD PRESSURE: 100 MMHG | RESPIRATION RATE: 18 BRPM | OXYGEN SATURATION: 100 % | HEART RATE: 92 BPM | SYSTOLIC BLOOD PRESSURE: 161 MMHG | BODY MASS INDEX: 32.23 KG/M2

## 2024-12-20 DIAGNOSIS — K40.90 LEFT INGUINAL HERNIA: Primary | ICD-10-CM

## 2024-12-20 PROCEDURE — 1125F AMNT PAIN NOTED PAIN PRSNT: CPT | Performed by: SURGERY

## 2024-12-20 PROCEDURE — 1159F MED LIST DOCD IN RCRD: CPT | Performed by: SURGERY

## 2024-12-20 PROCEDURE — 3008F BODY MASS INDEX DOCD: CPT | Performed by: SURGERY

## 2024-12-20 PROCEDURE — 3080F DIAST BP >= 90 MM HG: CPT | Performed by: SURGERY

## 2024-12-20 PROCEDURE — 99213 OFFICE O/P EST LOW 20 MIN: CPT | Performed by: SURGERY

## 2024-12-20 PROCEDURE — 1036F TOBACCO NON-USER: CPT | Performed by: SURGERY

## 2024-12-20 PROCEDURE — 3077F SYST BP >= 140 MM HG: CPT | Performed by: SURGERY

## 2024-12-20 ASSESSMENT — PAIN SCALES - GENERAL: PAINLEVEL_OUTOF10: 5

## 2024-12-20 NOTE — LETTER
December 20, 2024     Fabian Miller DO  [address]  [address]  [facility] [address]    Patient: Jakob Austin   YOB: 1955   Date of Visit: 12/20/2024       Dear Dr. Fabian Miller DO:    Thank you for referring Jakob Austin to me for evaluation. Below are my notes for this consultation.  If you have questions, please do not hesitate to call me. I look forward to following your patient along with you.       Sincerely,     Venkatesh Santos MD      CC: No Recipients  ______________________________________________________________________________________    History Of Present Illness  HPI   October 23, 2024  The patient is a 68-year-old male referred for a left inguinal hernia.  The patient complains of a month and a half history of left inguinal discomfort.  He does not recall any injury.  No prior left inguinal hernia repair.  He had repair of a right inguinal hernia as a 8-year-old.    December 20, 2024  The patient reports no change in his hernia.  He does complain of a 2-month history of some intermittent mild cramping postprandial abdominal pain associate with mild nausea.  It seems to happen a couple times a week.  He has no vomiting.  No constipation or diarrhea or blood in his stool.  He has no pain at this time.  No prior abdominal operation other than his right inguinal hernia repair.  He reports having a colonoscopy about 3 years ago which he states was unremarkable.    Past medical history:  Coronary artery disease status post stent placement in 2011.  He takes a daily baby aspirin.  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     Past Medical History  He has no past medical history on file.    Surgical History  He has no past surgical history on file.     Allergies  Patient has no known allergies.    Social History  He reports that he has never smoked. He has never used smokeless tobacco. He reports that he does not drink alcohol and  "does not use drugs.    Family History  Family History   Problem Relation Name Age of Onset   • Hypertension Mother     • Hyperlipidemia Mother         Last Recorded Vitals  Blood pressure (!) 161/100, pulse 92, temperature 36.8 °C (98.3 °F), temperature source Temporal, resp. rate 18, height 1.753 m (5' 9\"), weight 98.7 kg (217 lb 9.6 oz), SpO2 100%.    Physical Exam  Constitutional: Well-developed, well-nourished, alert and oriented, no acute distress  Skin: Warm and dry, no lesions, no rashes, no jaundice  HEENT: Normocephalic, atraumatic, EOMI, no scleral icterus, eyes have no redness or swelling or discharge, external inspection of ears and nose is normal, mucous membranes moist  Neck: Soft, nontender, no mass or adenopathy  Cardiac: Regular rate and rhythm, no murmur  Chest: Patent airway, clear to auscultation, normal breath sounds with good chest expansion, no wheezes or rales or rhonchi noted, thorax symmetric  Abdomen: Nondistended, positive bowel sounds, soft, nontender, no mass.  Tiny umbilical hernia which is easily reducible.  Left inguinal hernia, reducible.  Right inguinal scar.  No right inguinal or femoral hernias palpable.  Rectal: Not performed  Extremities: No injury, no lower extremity edema or calf tenderness  Lymphatic: No cervical adenopathy  Musculoskeletal: Range of motion intact, no joint swelling, normal strength  Neurological: Alert and oriented x3, intact sensory and motor function, no obvious focal neurologic abnormalities, normal gait  Psychological: Appropriate mood and behavior  The patient declined a chaperone.    Relevant Results  Labs from October 8, 2024.  WBC 8.5, hemoglobin 14.0, platelet 214.  Electrolytes normal, BUN 24, glucose 108        Assessment/Plan  Diagnoses and all orders for this visit:  Left inguinal hernia      68-year-old male with a symptomatic, but reducible left inguinal hernia.  Recommend left inguinal herniorrhaphy with mesh either open or robotic assisted " "laparoscopic.  I again discussed the procedure and risks with the patient. The risks include bleeding, infection, mesh infection, recurrence, injury to surrounding structures such as nerves/blood vessels/intestine/bladder, chronic postop pain, cardiac/pulmonary complications.   If the mesh becomes infected it could require excision.  I have discussed the alternative of hernia repair without mesh and observation.  The patient wishes to have a robotic assisted laparoscopic left inguinal herniorrhaphy with mesh with possible bilateral repair and possible open operation.      Electronic consent has been obtained.  I would recommend observation of the tiny asymptomatic reducible umbilical hernia.  I told the patient to recheck his blood pressure at home and if it remains elevated to notify his primary care physician.  I discussed ordering a CT scan of the abdomen and pelvis for further evaluation of his cramping discomfort which I feel is unrelated to his hernia.  The patient does not wish to have a CT scan performed at this time.  He does wish to proceed with his operation.  I received a cardiac risk assessment note from Dr. Scott which states \"okay to proceed with proposed surgery without additional cardiovascular testing, patient is acceptable risk from cardiac standpoint.\"       Venkatesh Santos MD  "

## 2025-01-02 ENCOUNTER — ANESTHESIA (OUTPATIENT)
Dept: OPERATING ROOM | Facility: HOSPITAL | Age: 70
End: 2025-01-02
Payer: MEDICARE

## 2025-01-02 ENCOUNTER — PHARMACY VISIT (OUTPATIENT)
Dept: PHARMACY | Facility: CLINIC | Age: 70
End: 2025-01-02
Payer: COMMERCIAL

## 2025-01-02 ENCOUNTER — ANESTHESIA EVENT (OUTPATIENT)
Dept: OPERATING ROOM | Facility: HOSPITAL | Age: 70
End: 2025-01-02
Payer: MEDICARE

## 2025-01-02 ENCOUNTER — HOSPITAL ENCOUNTER (OUTPATIENT)
Facility: HOSPITAL | Age: 70
Setting detail: OUTPATIENT SURGERY
Discharge: HOME | End: 2025-01-02
Attending: SURGERY | Admitting: SURGERY
Payer: MEDICARE

## 2025-01-02 VITALS
WEIGHT: 217.59 LBS | SYSTOLIC BLOOD PRESSURE: 148 MMHG | DIASTOLIC BLOOD PRESSURE: 81 MMHG | BODY MASS INDEX: 32.23 KG/M2 | HEIGHT: 69 IN | RESPIRATION RATE: 17 BRPM | TEMPERATURE: 99.9 F | OXYGEN SATURATION: 95 % | HEART RATE: 87 BPM

## 2025-01-02 DIAGNOSIS — K40.90 LEFT INGUINAL HERNIA: Primary | ICD-10-CM

## 2025-01-02 PROCEDURE — 3600000017 HC OR TIME - EACH INCREMENTAL 1 MINUTE - PROCEDURE LEVEL SIX: Performed by: SURGERY

## 2025-01-02 PROCEDURE — 2500000004 HC RX 250 GENERAL PHARMACY W/ HCPCS (ALT 636 FOR OP/ED): Performed by: NURSE ANESTHETIST, CERTIFIED REGISTERED

## 2025-01-02 PROCEDURE — 2500000004 HC RX 250 GENERAL PHARMACY W/ HCPCS (ALT 636 FOR OP/ED): Performed by: SURGERY

## 2025-01-02 PROCEDURE — 2500000004 HC RX 250 GENERAL PHARMACY W/ HCPCS (ALT 636 FOR OP/ED): Mod: JZ | Performed by: SURGERY

## 2025-01-02 PROCEDURE — 49650 LAP ING HERNIA REPAIR INIT: CPT | Performed by: SURGERY

## 2025-01-02 PROCEDURE — 2500000004 HC RX 250 GENERAL PHARMACY W/ HCPCS (ALT 636 FOR OP/ED): Performed by: ANESTHESIOLOGY

## 2025-01-02 PROCEDURE — 3700000002 HC GENERAL ANESTHESIA TIME - EACH INCREMENTAL 1 MINUTE: Performed by: SURGERY

## 2025-01-02 PROCEDURE — 7100000010 HC PHASE TWO TIME - EACH INCREMENTAL 1 MINUTE: Performed by: SURGERY

## 2025-01-02 PROCEDURE — 7100000009 HC PHASE TWO TIME - INITIAL BASE CHARGE: Performed by: SURGERY

## 2025-01-02 PROCEDURE — 2500000005 HC RX 250 GENERAL PHARMACY W/O HCPCS: Performed by: SURGERY

## 2025-01-02 PROCEDURE — RXMED WILLOW AMBULATORY MEDICATION CHARGE

## 2025-01-02 PROCEDURE — 7100000001 HC RECOVERY ROOM TIME - INITIAL BASE CHARGE: Performed by: SURGERY

## 2025-01-02 PROCEDURE — 3700000001 HC GENERAL ANESTHESIA TIME - INITIAL BASE CHARGE: Performed by: SURGERY

## 2025-01-02 PROCEDURE — 3600000018 HC OR TIME - INITIAL BASE CHARGE - PROCEDURE LEVEL SIX: Performed by: SURGERY

## 2025-01-02 PROCEDURE — C1781 MESH (IMPLANTABLE): HCPCS | Performed by: SURGERY

## 2025-01-02 PROCEDURE — 7100000002 HC RECOVERY ROOM TIME - EACH INCREMENTAL 1 MINUTE: Performed by: SURGERY

## 2025-01-02 PROCEDURE — 2720000007 HC OR 272 NO HCPCS: Performed by: SURGERY

## 2025-01-02 PROCEDURE — 2780000003 HC OR 278 NO HCPCS: Performed by: SURGERY

## 2025-01-02 DEVICE — LAPAROSCOPIC SELF-FIXATING MESH POLYESTER WITH POLYLACTIC ACID GRIPS AND COLLAGEN FILM
Type: IMPLANTABLE DEVICE | Site: INGUINAL | Status: FUNCTIONAL
Brand: PROGRIP

## 2025-01-02 RX ORDER — SODIUM CHLORIDE, SODIUM LACTATE, POTASSIUM CHLORIDE, CALCIUM CHLORIDE 600; 310; 30; 20 MG/100ML; MG/100ML; MG/100ML; MG/100ML
100 INJECTION, SOLUTION INTRAVENOUS CONTINUOUS
Status: DISCONTINUED | OUTPATIENT
Start: 2025-01-02 | End: 2025-01-02 | Stop reason: HOSPADM

## 2025-01-02 RX ORDER — DIPHENHYDRAMINE HYDROCHLORIDE 50 MG/ML
12.5 INJECTION INTRAMUSCULAR; INTRAVENOUS ONCE AS NEEDED
Status: DISCONTINUED | OUTPATIENT
Start: 2025-01-02 | End: 2025-01-02 | Stop reason: HOSPADM

## 2025-01-02 RX ORDER — ACETAMINOPHEN 325 MG/1
650 TABLET ORAL EVERY 4 HOURS PRN
Status: DISCONTINUED | OUTPATIENT
Start: 2025-01-02 | End: 2025-01-02 | Stop reason: HOSPADM

## 2025-01-02 RX ORDER — HYDRALAZINE HYDROCHLORIDE 20 MG/ML
10 INJECTION INTRAMUSCULAR; INTRAVENOUS EVERY 30 MIN PRN
Status: DISCONTINUED | OUTPATIENT
Start: 2025-01-02 | End: 2025-01-02 | Stop reason: HOSPADM

## 2025-01-02 RX ORDER — SODIUM CHLORIDE, SODIUM LACTATE, POTASSIUM CHLORIDE, CALCIUM CHLORIDE 600; 310; 30; 20 MG/100ML; MG/100ML; MG/100ML; MG/100ML
INJECTION, SOLUTION INTRAVENOUS CONTINUOUS PRN
Status: DISCONTINUED | OUTPATIENT
Start: 2025-01-02 | End: 2025-01-02

## 2025-01-02 RX ORDER — FENTANYL CITRATE 50 UG/ML
INJECTION, SOLUTION INTRAMUSCULAR; INTRAVENOUS AS NEEDED
Status: DISCONTINUED | OUTPATIENT
Start: 2025-01-02 | End: 2025-01-02

## 2025-01-02 RX ORDER — ONDANSETRON HYDROCHLORIDE 2 MG/ML
4 INJECTION, SOLUTION INTRAVENOUS ONCE AS NEEDED
Status: DISCONTINUED | OUTPATIENT
Start: 2025-01-02 | End: 2025-01-02 | Stop reason: HOSPADM

## 2025-01-02 RX ORDER — GABAPENTIN 300 MG/1
300 CAPSULE ORAL 3 TIMES DAILY PRN
Qty: 15 CAPSULE | Refills: 0 | Status: SHIPPED | OUTPATIENT
Start: 2025-01-02

## 2025-01-02 RX ORDER — HYDROMORPHONE HYDROCHLORIDE 1 MG/ML
1 INJECTION, SOLUTION INTRAMUSCULAR; INTRAVENOUS; SUBCUTANEOUS EVERY 5 MIN PRN
Status: DISCONTINUED | OUTPATIENT
Start: 2025-01-02 | End: 2025-01-02 | Stop reason: HOSPADM

## 2025-01-02 RX ORDER — SODIUM CHLORIDE 9 MG/ML
INJECTION, SOLUTION INTRAVENOUS CONTINUOUS PRN
Status: DISCONTINUED | OUTPATIENT
Start: 2025-01-02 | End: 2025-01-02

## 2025-01-02 RX ORDER — LIDOCAINE HCL/PF 100 MG/5ML
SYRINGE (ML) INTRAVENOUS AS NEEDED
Status: DISCONTINUED | OUTPATIENT
Start: 2025-01-02 | End: 2025-01-02

## 2025-01-02 RX ORDER — PROPOFOL 10 MG/ML
INJECTION, EMULSION INTRAVENOUS AS NEEDED
Status: DISCONTINUED | OUTPATIENT
Start: 2025-01-02 | End: 2025-01-02

## 2025-01-02 RX ORDER — MEPERIDINE HYDROCHLORIDE 25 MG/ML
12.5 INJECTION INTRAMUSCULAR; INTRAVENOUS; SUBCUTANEOUS EVERY 10 MIN PRN
Status: DISCONTINUED | OUTPATIENT
Start: 2025-01-02 | End: 2025-01-02 | Stop reason: HOSPADM

## 2025-01-02 RX ORDER — ROCURONIUM BROMIDE 10 MG/ML
INJECTION, SOLUTION INTRAVENOUS AS NEEDED
Status: DISCONTINUED | OUTPATIENT
Start: 2025-01-02 | End: 2025-01-02

## 2025-01-02 RX ORDER — OXYCODONE HYDROCHLORIDE 5 MG/1
5 TABLET ORAL EVERY 6 HOURS PRN
Qty: 8 TABLET | Refills: 0 | Status: SHIPPED | OUTPATIENT
Start: 2025-01-02

## 2025-01-02 RX ORDER — WATER 1 ML/ML
IRRIGANT IRRIGATION AS NEEDED
Status: DISCONTINUED | OUTPATIENT
Start: 2025-01-02 | End: 2025-01-02 | Stop reason: HOSPADM

## 2025-01-02 RX ORDER — LABETALOL HYDROCHLORIDE 5 MG/ML
10 INJECTION, SOLUTION INTRAVENOUS ONCE AS NEEDED
Status: DISCONTINUED | OUTPATIENT
Start: 2025-01-02 | End: 2025-01-02 | Stop reason: HOSPADM

## 2025-01-02 RX ORDER — BUPIVACAINE HYDROCHLORIDE 5 MG/ML
INJECTION, SOLUTION PERINEURAL AS NEEDED
Status: DISCONTINUED | OUTPATIENT
Start: 2025-01-02 | End: 2025-01-02 | Stop reason: HOSPADM

## 2025-01-02 RX ORDER — METOPROLOL TARTRATE 1 MG/ML
INJECTION, SOLUTION INTRAVENOUS AS NEEDED
Status: DISCONTINUED | OUTPATIENT
Start: 2025-01-02 | End: 2025-01-02

## 2025-01-02 RX ORDER — MIDAZOLAM HYDROCHLORIDE 1 MG/ML
INJECTION, SOLUTION INTRAMUSCULAR; INTRAVENOUS AS NEEDED
Status: DISCONTINUED | OUTPATIENT
Start: 2025-01-02 | End: 2025-01-02

## 2025-01-02 RX ORDER — ONDANSETRON HYDROCHLORIDE 2 MG/ML
INJECTION, SOLUTION INTRAVENOUS AS NEEDED
Status: DISCONTINUED | OUTPATIENT
Start: 2025-01-02 | End: 2025-01-02

## 2025-01-02 RX ORDER — CEFAZOLIN SODIUM 2 G/100ML
2 INJECTION, SOLUTION INTRAVENOUS ONCE
Status: COMPLETED | OUTPATIENT
Start: 2025-01-02 | End: 2025-01-02

## 2025-01-02 RX ADMIN — ONDANSETRON 4 MG: 2 INJECTION INTRAMUSCULAR; INTRAVENOUS at 10:57

## 2025-01-02 RX ADMIN — LIDOCAINE HYDROCHLORIDE 40 MG: 20 INJECTION INTRAVENOUS at 09:50

## 2025-01-02 RX ADMIN — HYDROMORPHONE HYDROCHLORIDE 0.5 MG: 1 INJECTION, SOLUTION INTRAMUSCULAR; INTRAVENOUS; SUBCUTANEOUS at 12:12

## 2025-01-02 RX ADMIN — METOPROLOL TARTRATE 2 MG: 5 INJECTION INTRAVENOUS at 09:49

## 2025-01-02 RX ADMIN — METOPROLOL TARTRATE 3 MG: 5 INJECTION INTRAVENOUS at 09:42

## 2025-01-02 RX ADMIN — CEFAZOLIN SODIUM 2 G: 2 INJECTION, SOLUTION INTRAVENOUS at 09:53

## 2025-01-02 RX ADMIN — ROCURONIUM BROMIDE 50 MG: 10 INJECTION, SOLUTION INTRAVENOUS at 09:44

## 2025-01-02 RX ADMIN — SUGAMMADEX 200 MG: 100 INJECTION, SOLUTION INTRAVENOUS at 11:02

## 2025-01-02 RX ADMIN — LIDOCAINE HYDROCHLORIDE 60 MG: 20 INJECTION INTRAVENOUS at 09:43

## 2025-01-02 RX ADMIN — PROPOFOL 130 MG: 10 INJECTION, EMULSION INTRAVENOUS at 09:43

## 2025-01-02 RX ADMIN — MIDAZOLAM 2 MG: 1 INJECTION INTRAMUSCULAR; INTRAVENOUS at 09:40

## 2025-01-02 RX ADMIN — LIDOCAINE HYDROCHLORIDE 100 MG: 20 INJECTION INTRAVENOUS at 09:53

## 2025-01-02 RX ADMIN — SODIUM CHLORIDE: 9 INJECTION, SOLUTION INTRAVENOUS at 09:37

## 2025-01-02 RX ADMIN — FENTANYL CITRATE 100 MCG: 50 INJECTION, SOLUTION INTRAMUSCULAR; INTRAVENOUS at 09:43

## 2025-01-02 SDOH — HEALTH STABILITY: MENTAL HEALTH: CURRENT SMOKER: 0

## 2025-01-02 ASSESSMENT — PAIN SCALES - GENERAL
PAINLEVEL_OUTOF10: 6
PAIN_LEVEL: 0
PAINLEVEL_OUTOF10: 0 - NO PAIN
PAINLEVEL_OUTOF10: 0 - NO PAIN

## 2025-01-02 ASSESSMENT — COLUMBIA-SUICIDE SEVERITY RATING SCALE - C-SSRS
2. HAVE YOU ACTUALLY HAD ANY THOUGHTS OF KILLING YOURSELF?: NO
2. HAVE YOU ACTUALLY HAD ANY THOUGHTS OF KILLING YOURSELF?: NO
6. HAVE YOU EVER DONE ANYTHING, STARTED TO DO ANYTHING, OR PREPARED TO DO ANYTHING TO END YOUR LIFE?: NO
1. IN THE PAST MONTH, HAVE YOU WISHED YOU WERE DEAD OR WISHED YOU COULD GO TO SLEEP AND NOT WAKE UP?: NO
6. HAVE YOU EVER DONE ANYTHING, STARTED TO DO ANYTHING, OR PREPARED TO DO ANYTHING TO END YOUR LIFE?: NO
1. IN THE PAST MONTH, HAVE YOU WISHED YOU WERE DEAD OR WISHED YOU COULD GO TO SLEEP AND NOT WAKE UP?: NO

## 2025-01-02 ASSESSMENT — PAIN DESCRIPTION - LOCATION: LOCATION: ABDOMEN

## 2025-01-02 ASSESSMENT — PAIN - FUNCTIONAL ASSESSMENT
PAIN_FUNCTIONAL_ASSESSMENT: 0-10
PAIN_FUNCTIONAL_ASSESSMENT: 0-10

## 2025-01-02 NOTE — ANESTHESIA POSTPROCEDURE EVALUATION
Patient: Jakob Austin    Procedure Summary       Date: 01/02/25 Room / Location: [facility] OR 05 / [facility]    Anesthesia Start: 0937 Anesthesia Stop:     Procedure: ROBOTIC ASSISTED LAPAROSCOPIC LEFT INGUINAL HERNIA REPAIR WITH MESH (Left) Diagnosis:       Left inguinal hernia      (Left inguinal hernia [K40.90])    Surgeons: Venkatesh Santos MD Responsible Provider: Kyrie Jeffers MD    Anesthesia Type: general ASA Status: 3            Anesthesia Type: general    Vitals Value Taken Time   /85 01/02/25 1115   Temp 37.7 °C (99.9 °F) 01/02/25 1110   Pulse 86 01/02/25 1114   Resp 16 01/02/25 1110   SpO2 99 % 01/02/25 1114   Vitals shown include unfiled device data.    Anesthesia Post Evaluation    Patient location during evaluation: PACU  Patient participation: complete - patient participated  Level of consciousness: awake  Pain score: 0  Pain management: adequate  Airway patency: patent  Cardiovascular status: acceptable, blood pressure returned to baseline and hemodynamically stable  Respiratory status: acceptable and face mask  Hydration status: acceptable  Postoperative Nausea and Vomiting: none        There were no known notable events for this encounter.

## 2025-01-02 NOTE — ANESTHESIA PREPROCEDURE EVALUATION
Patient: Jakob Austin    Procedure Information       Date/Time: 01/02/25 0950    Procedure: ROBOTIC ASSISTED LAPAROSCOPIC LEFT POSSIBLE BILATERAL INGUINAL HERNIA REPAIR WITH MESH/ POSSIBLE OPEN (Left)    Location: [facility] 05 / [facility]    Surgeons: Venkatesh Santos MD            Relevant Problems   Anesthesia (within normal limits)      Cardiac   (+) CAD (coronary artery disease)   (+) Exertional chest pain   (+) Hypertension   (+) Presence of stent in left circumflex coronary artery      Pulmonary   (+) Dyspnea on exertion      GI   (+) Gastroesophageal reflux disease      /Renal   (+) Benign prostatic hyperplasia      Endocrine   (+) Obesity       Clinical information reviewed:   Tobacco  Allergies  Meds   Med Hx  Surg Hx   Fam Hx          NPO Detail:  NPO/Void Status  Carbohydrate Drink Given Prior to Surgery? : N  Date of Last Liquid: 01/01/25  Time of Last Liquid: 2000  Date of Last Solid: 01/01/25  Time of Last Solid: 1800  Last Intake Type: Clear fluids  Time of Last Void: 0800         Physical Exam    Airway  Mallampati: II  TM distance: >3 FB  Neck ROM: full     Cardiovascular - normal exam  Rhythm: regular  Rate: normal     Dental - normal exam     Pulmonary - normal exam     Abdominal            Anesthesia Plan    History of general anesthesia?: yes  History of complications of general anesthesia?: no    ASA 3     general     The patient is not a current smoker.    intravenous induction   Postoperative administration of opioids is intended.  Trial extubation is planned.  Anesthetic plan and risks discussed with patient.  Use of blood products discussed with patient who consented to blood products.    Plan discussed with CRNA.

## 2025-01-02 NOTE — OP NOTE
ROBOTIC ASSISTED LAPAROSCOPIC LEFT POSSIBLE BILATERAL INGUINAL HERNIA REPAIR WITH MESH/ POSSIBLE OPEN (L) Operative Note     Date: 2025  OR Location: [facility]    Name: Jakob Austin, : 1955, Age: 69 y.o., MRN: [id number], Sex: male    Diagnosis  Pre-op Diagnosis      * Left inguinal hernia [K40.90] Post-op Diagnosis     * Left inguinal hernia [K40.90]     Procedures  ROBOTIC ASSISTED LAPAROSCOPIC LEFT POSSIBLE BILATERAL INGUINAL HERNIA REPAIR WITH MESH/ POSSIBLE OPEN  [id number] - NM LAPAROSCOPY SURG RPR INITIAL INGUINAL HERNIA      Surgeons      * Venkatesh Santos - Primary    Resident/Fellow/Other Assistant:  Surgeons and Role:  * No surgeons found with a matching role *    Staff:   Circulator: Daylin Huertas Person: Flower  Surgical Assistant: Oksana  Surgical Assistant: Tee Foreman Circulator: Sheri Foreman Scrub: Pool    Anesthesia Staff: Anesthesiologist: Kyrie Jeffers MD  CRNA: NEVAEH Dela Cruz-CRNA    Procedure Summary  Anesthesia: General  ASA: III  Estimated Blood Loss: 0mL  Intra-op Medications: * Intraprocedure medication information is unavailable because the case start and end events have not been set *           Anesthesia Record               Intraprocedure I/O Totals          Intake    ceFAZolin (Ancef) 2 g in dextrose (iso)  mL 100.00 mL    Total Intake 100 mL          Specimen: No specimens collected              Drains and/or Catheters:   Urethral Catheter Non-latex 16 Fr. (Active)       Tourniquet Times:         Implants:  Implants       Type Name Action Serial No.      Surgical Mesh Sling Implant MESH, PROGRIP LAP, 10 X 15 CM, FLATSHEET - [id number] Implanted               Findings: Left direct inguinal hernia.  No right inguinal hernia.    Indications: Jakob Austin is an 69 y.o. male who is having surgery for Left inguinal hernia [K40.90].     The patient was seen in the preoperative area. The risks, benefits, complications, treatment options, non-operative alternatives,  expected recovery and outcomes were discussed with the patient. The possibilities of reaction to medication, pulmonary aspiration, injury to surrounding structures, bleeding, recurrent infection, the need for additional procedures, failure to diagnose a condition, and creating a complication requiring transfusion or operation were discussed with the patient. The patient concurred with the proposed plan, giving informed consent.  The site of surgery was properly noted/marked if necessary per policy. The patient has been actively warmed in preoperative area. Preoperative antibiotics have been ordered and given within 1 hours of incision. Venous thrombosis prophylaxis have been ordered including bilateral sequential compression devices    Procedure Details: Following informed consent the patient was taken to the operating room and placed in supine position.  A huddle was performed.  The patient was given general anesthetic.  Sequential compression devices were in place and functioning.  The patient received Ancef IV in the operating room.  The abdomen was prepped and draped in sterile fashion.  A timeout was performed.  A left upper quadrant skin incision was made at Monroy's point and a 5 mm Visiport inserted under direct vision.  The abdomen was insufflated with carbon dioxide to a pressure of 15 mmHg.  An 8 mm port was placed into the right upper quadrant under direct vision and another 8 mm port placed into the epigastric area just off midline under direct vision.  The left upper quadrant 5 mm port was exchanged for an 8 mm port.  The patient was placed into Trendelenburg position.  The robot was docked.  The pelvis was examined.  The patient was found to have a [left direct] inguinal hernia.  A hockey-stick shaped peritoneal incision was made in the [left pelvis] pelvis.  The region of the myopectineal orifice was dissected.  Dissection continued 2 cm across midline and 2 cm posterior to Anson's ligament.   Lateral dissection was performed.  The spermatic vessels and vas deferens were parietalized.  [The hernia sac was]reduced.  Hemostasis appeared to be excellent.  A 10 x 15 cm diameter ProGrip mesh was inserted and secured.  The mesh was secured to the medial and superior edge of the direct hernia defect with interrupted 3-0 Vicryl sutures.  This mesh provided wide overlap of the myopectineal orifice.  Insufflation pressure was decreased to 10 mmHg.  The peritoneum was reapproximated with a running absorbable barbed suture.  The abdomen was deflated and the ports were removed.  Half percent plain Marcaine was infiltrated into each of the fascial incisions.  Skin incisions were closed with running 4-0 Vicryl subcuticular sutures.  Dressings were placed and the patient was taken to the recovery room in satisfactory condition having tolerated the procedure well.  Counts were correct.  Complications:  None; patient tolerated the procedure well.    Disposition: PACU - hemodynamically stable.  Condition: stable         Task Performed by RNFA or Surgical Assistant:  Camera direction and skin closure        Venkatesh Santos  Phone Number: [phone]

## 2025-01-03 ASSESSMENT — PAIN SCALES - GENERAL: PAINLEVEL_OUTOF10: 0 - NO PAIN

## 2025-01-06 ENCOUNTER — APPOINTMENT (OUTPATIENT)
Dept: SURGERY | Facility: CLINIC | Age: 70
End: 2025-01-06
Payer: MEDICARE

## 2025-01-13 ENCOUNTER — TELEPHONE (OUTPATIENT)
Dept: PRIMARY CARE | Facility: CLINIC | Age: 70
End: 2025-01-13
Payer: MEDICARE

## 2025-01-13 ENCOUNTER — TELEPHONE (OUTPATIENT)
Dept: SURGERY | Facility: CLINIC | Age: 70
End: 2025-01-13
Payer: MEDICARE

## 2025-01-13 NOTE — TELEPHONE ENCOUNTER
Patient called stating he had hernia surgery January 2 and is still having problems urinating.  I discussed with Dr. Santos who suggested his urologist first if he is established, his PCP, or the ED as more than likely the PCP will send him there anyway.  After discussing with patient who stated he does not have a urologist, I suggested he could call Dr. Bello team and see what they recommend but they too might recommend the emergency room.  The patient verbalized appreciation for the suggestions.  All of patient's questions were addressed and answered.    [facility]    Medicine Progress Note - Hospitalist Service    Date of Admission:  1/23/2022    Assessment & Plan          72-year-old female with COPD, hypertension, urinary incontinence, muscle spasms, previous breast cancer, currently in remission presented with right femoral neck fracture due to mechanical fall after she slipped on ice while shoveling her driveway.  Lives independently at home and sometimes uses cane for ambulation.    #.  Acute closed right femoral neck fracture-secondary to mechanical fall  -Slipped on ice and fell on right hip while shoveling her driveway.  X-ray with right femoral neck fracture  -Labs unremarkable  -orthopedics consulted.  2 OR later today for right total hip arthroplasty.  Continue bedrest for now  -Continue pain control  -Continue gentle IV fluids  -Monitor hemoglobin    #.  Bilateral otitis media-on p.o. doxycycline and ciprofloxacin eardrops for 10 days.   -Continue p.o. doxycycline until 1/28    #. COPD with centrilobular emphysema without acute exacerbation  -Continue PTA inhaler    #.  Essential hypertension-not currently on medications    #.  Muscle spasms-on Flexeril PTA    #.  COVID-19 PCR negative on admission.  Patient is vaccinated and boosted    #.  Breast cancer, s/p right mastectomy over 15 years ago.  No recurrence       Diet: NPO per Anesthesia Guidelines for Procedure/Surgery Except for: Meds    DVT Prophylaxis: SCDs for now pending surgery  Baltazar Catheter: Not present  Central Lines: None  Cardiac Monitoring: None  Code Status: Full Code      Disposition Plan   Expected Discharge: 01/26/2022     Anticipated discharge location: home    Delays:            The patient's care was discussed with the Bedside Nurse and Patient.    Tangela Swenson MD  Hospitalist Service  [facility]  Securely message with the Vocera Web Console (learn more here)  Text page via [employer] Paging/Directory         Clinically Significant  Risk Factors Present on Admission                    ______________________________________________________________________    Interval History   Complains of hip pain due to fracture.  Denies any chest pain or shortness of breath.  No dizziness.  Denies any nausea vomiting abdominal abdominal pain, denies dysuria.       Data reviewed today: I reviewed all medications, new labs and imaging results over the last 24 hours. I personally reviewed the EKG tracing showing Normal sinus rhythm and the Pelvic x-ray image(s) showing Right Femoral neck fracture.    X-ray pelvis with hip 1/23/2022  There is a displaced transverse fracture of the right femoral neck with a typical varus angulation at the fracture site. Mild bilateral hip degenerative changes. Degenerative changes in the lower lumbar spine.    Physical Exam   Vital Signs: Temp: 98.8  F (37.1  C) Temp src: Temporal BP: 123/59 Pulse: 79   Resp: 18 SpO2: 95 % O2 Device: None (Room air)    Weight: 160 lbs 0 oz   Physical Exam  Constitutional:       General: She is not in acute distress.  HENT:      Head: Normocephalic and atraumatic.   Cardiovascular:      Rate and Rhythm: Normal rate and regular rhythm.      Heart sounds: No murmur heard.      Pulmonary:      Effort: Pulmonary effort is normal.      Breath sounds: Normal breath sounds. No wheezing or rhonchi.   Abdominal:      General: There is no distension.      Palpations: Abdomen is soft.      Tenderness: There is no abdominal tenderness.   Musculoskeletal:         General: Deformity present. No swelling.   Skin:     General: Skin is warm and dry.   Neurological:      General: No focal deficit present.      Mental Status: She is alert. Mental status is at baseline.   Psychiatric:         Mood and Affect: Mood normal.           Data   Recent Labs   Lab 01/24/22  0752 01/23/22  1552   WBC  --  6.2   HGB  --  12.8   MCV  --  91   PLT  --  171   INR  --  0.98    135   POTASSIUM 3.7 4.0   CHLORIDE 103 103   CO2  26 28   BUN 16 19   CR 0.69 0.75   ANIONGAP 4 4   JOJO 8.7 9.0   * 102*     Medications     lactated ringers 25 mL/hr at 01/24/22 1132     sodium chloride 100 mL/hr at 01/24/22 0421       ceFAZolin  2 g Intravenous See Admin Instructions     [Auto Hold] ciprofloxacin  0.25 mL Both Ears BID     [Auto Hold] doxycycline hyclate  100 mg Oral BID     [Auto Hold] fluticasone  1-2 spray Both Nostrils Daily     [Auto Hold] loratadine  10 mg Oral Daily     ropivacaine 300 mg, ketorolac 30 mg, EPHINEPHrine 0.6 mg in sodium chloride 0.9%   INTRA-ARTICULAR On Call to OR     [Auto Hold] sodium chloride (PF)  3 mL Intracatheter Q8H     tranexamic acid  1 g Intravenous Once     [Auto Hold] umeclidinium-vilanterol  1 puff Inhalation Daily

## 2025-01-13 NOTE — TELEPHONE ENCOUNTER
Pt was here in oct & is having uti symptoms, hard time urinating,  Pressure before releasing.  Would like call.  Ty

## 2025-01-14 DIAGNOSIS — N39.0 URINARY TRACT INFECTION ASSOCIATED WITH CATHETERIZATION OF URINARY TRACT, UNSPECIFIED INDWELLING URINARY CATHETER TYPE, SUBSEQUENT ENCOUNTER: Primary | ICD-10-CM

## 2025-01-14 DIAGNOSIS — T83.511D URINARY TRACT INFECTION ASSOCIATED WITH CATHETERIZATION OF URINARY TRACT, UNSPECIFIED INDWELLING URINARY CATHETER TYPE, SUBSEQUENT ENCOUNTER: Primary | ICD-10-CM

## 2025-01-14 RX ORDER — CIPROFLOXACIN 500 MG/1
500 TABLET ORAL 2 TIMES DAILY
Qty: 20 TABLET | Refills: 0 | Status: SHIPPED | OUTPATIENT
Start: 2025-01-14 | End: 2025-01-24

## 2025-01-14 NOTE — TELEPHONE ENCOUNTER
Pt called again & would like abx for urinary tract infection.  Just had the hernia surgery 1/2/2025  Everything is good otherwise.  Darnell welch-[phone]

## 2025-01-21 PROBLEM — Z09 POSTOP CHECK: Status: ACTIVE | Noted: 2025-01-21

## 2025-01-21 NOTE — PROGRESS NOTES
"History Of Present Illness  Jakob Austin is a 69 y.o. male status post robotic assisted laparoscopic left direct inguinal herniorrhaphy with mesh on January 2, 2025.  On postop day #1 the patient slipped on ice.  He had some discomfort which has improved.  He had difficulty urinating for about 1 week postop.  He was given oral antibiotics by his primary care physician.  His symptoms have now improved.  He also complains of some mild burning discomfort extending from the lower back down the posterior portion of his left thigh to his knee.  He has no abdominal pain.  He did not take any oxycodone postop.  He returned to normal daily activity in 7 days.     Last Recorded Vitals  Blood pressure 152/88, pulse 93, temperature 36.6 °C (97.9 °F), temperature source Temporal, resp. rate 16, height 1.753 m (5' 9\"), weight 98.1 kg (216 lb 3.2 oz), SpO2 98%.    Physical Exam  General: Well-developed, well-nourished, no acute distress, alert and oriented  Wound: Intact, no erythema or signs of infection    Assessment/Plan   Diagnoses and all orders for this visit:  Postop check  Recovering well.  Follow-up as needed.  Follow-up with PCP regarding the posterior thigh discomfort.      Venkatesh Santos MD  " Hospitalist Progress Note    NAME: Monico Damon   :  3/7/1949   MRN:  [id number]       Assessment / Plan:  Essential tremors with recent worsening  -primidone on hold  -EEG showed mild generalized slowing either seen as age related changes or mild encephalopathy. No seizures, epileptogenic discharges or focal asymmetry seen.   -gabapentin per neurology  -appreciate neuro's rec  -PT/OT. SAH to eval today    Generalized deconditioning likely multifactorial could be spectrum of symptoms from essential tremors  -PT/OT consulted, rec inpt rehab but needs 3 night stay    Diabetes mellitus  -cont' home insulin, SSI    History of coronary artery disease  Systolic congestive heart failure compensated  HLD  -recent ejection fraction is 40%  -cont' lasix, statin, aspirin and Plavix  -cardiology consulted in regards to his midodrine    History of tongue and throat cancer status post PEG tube placement  -nutrition consulted, appreciate recs  -klack solution- swish and spit    Diabetic neuropathy  -con't gabapentin      Code Status: Full   Surrogate Decision Maker: Wife Israel Lin  DVT Prophylaxis: heparin  GI Prophylaxis: not indicated   Baseline: From home with recent functional decline since he was started on medication from Tremors     Subjective:     Chief Complaint / Reason for Physician Visit  Pt seen at bedside. No acute event overnight. Wife states pt is too sedated on gabapentin, however temors is less today. Pt appears to be doing well ambulating with PT in the [address] way. Discussed with RN events overnight.      Review of Systems:  Symptom Y/N Comments  Symptom Y/N Comments   Fever/Chills n   Chest Pain     Poor Appetite    Edema     Cough    Abdominal Pain n    Sputum    Joint Pain     SOB/KRAMER n   Pruritis/Rash     Nausea/vomit    Tolerating PT/OT     Diarrhea    Tolerating Diet     Constipation    Other       Could NOT obtain due to:      Objective:     VITALS:   Last 24hrs VS reviewed since prior progress note. Most recent are:  Patient Vitals for the past 24 hrs:   Temp Pulse Resp BP SpO2   06/28/18 1123 97.8 °F (36.6 °C) 73 18 138/81 98 %   06/28/18 0811 98 °F (36.7 °C) 79 16 128/67 95 %   06/27/18 1947 - - - 154/77 -   06/27/18 1746 - - - 148/64 96 %   06/27/18 1700 - - - 141/70 95 %     No intake or output data in the 24 hours ending 06/28/18 1618     PHYSICAL EXAM:  General: WD, WN. Alert, cooperative, no acute distress    EENT:  EOMI. Anicteric sclerae. MMM  Resp:  CTA bilaterally, no wheezing or rales. No accessory muscle use  CV:  Regular  rhythm,  No edema  GI:  Soft, ND, NT  +Bowel sounds, PEG in place. Neurologic:  Alert and oriented X 3, normal speech  Psych:   Good insight. Not anxious nor agitated  Skin:  No rashes. No jaundice    Reviewed most current lab test results and cultures  YES  Reviewed most current radiology test results   YES  Review and summation of old records today    NO  Reviewed patient's current orders and MAR    YES  PMH/ reviewed - no change compared to H&P  ________________________________________________________________________  Care Plan discussed with:    Comments   Patient x    Family  x Pt's wife   RN x    Care Manager     Consultant                        Multidiciplinary team rounds were held today with , nursing, pharmacist and clinical coordinator. Patient's plan of care was discussed; medications were reviewed and discharge planning was addressed.      ________________________________________________________________________  Total NON critical care TIME:  35   Minutes    Total CRITICAL CARE TIME Spent:   Minutes non procedure based      Comments   >50% of visit spent in counseling and coordination of care     ________________________________________________________________________  Cindy Humphreys MD     Procedures: see electronic medical records for all procedures/Xrays and details which were not copied into this note but were reviewed prior to creation of Plan.      LABS:  I reviewed today's most current labs and imaging studies.   Pertinent labs include:  Recent Labs      06/28/18   0535  06/27/18   1152   WBC  4.0*  3.4*   HGB  10.3*  12.9   HCT  28.9*  36.2*   PLT  35*  70*     Recent Labs      06/28/18   0535  06/27/18   1725  06/27/18   1152   NA  135*   --   133*   K  3.8   --   4.8   CL  103   --   96*   CO2  20*   --   26   GLU  303*   --   511*   BUN  35*   --   41*   CREA  1.42*   --   1.99*   CA  7.0*   --   8.2*   ALB  2.3*   --   3.1*   TBILI  0.5   --   0.6   SGOT  142*   --   158*   ALT  161*   --   231*   INR   --   1.0   --        Signed: Skinny Jefferson MD

## 2025-01-22 ENCOUNTER — APPOINTMENT (OUTPATIENT)
Dept: SURGERY | Facility: CLINIC | Age: 70
End: 2025-01-22
Payer: MEDICARE

## 2025-01-22 VITALS
TEMPERATURE: 97.9 F | HEART RATE: 93 BPM | OXYGEN SATURATION: 98 % | WEIGHT: 216.2 LBS | SYSTOLIC BLOOD PRESSURE: 152 MMHG | BODY MASS INDEX: 32.02 KG/M2 | HEIGHT: 69 IN | RESPIRATION RATE: 16 BRPM | DIASTOLIC BLOOD PRESSURE: 88 MMHG

## 2025-01-22 DIAGNOSIS — Z09 POSTOP CHECK: Primary | ICD-10-CM

## 2025-01-22 PROCEDURE — 3079F DIAST BP 80-89 MM HG: CPT | Performed by: SURGERY

## 2025-01-22 PROCEDURE — 1159F MED LIST DOCD IN RCRD: CPT | Performed by: SURGERY

## 2025-01-22 PROCEDURE — 1126F AMNT PAIN NOTED NONE PRSNT: CPT | Performed by: SURGERY

## 2025-01-22 PROCEDURE — 3008F BODY MASS INDEX DOCD: CPT | Performed by: SURGERY

## 2025-01-22 PROCEDURE — 1036F TOBACCO NON-USER: CPT | Performed by: SURGERY

## 2025-01-22 PROCEDURE — 99024 POSTOP FOLLOW-UP VISIT: CPT | Performed by: SURGERY

## 2025-01-22 PROCEDURE — 3077F SYST BP >= 140 MM HG: CPT | Performed by: SURGERY

## 2025-01-22 ASSESSMENT — PAIN SCALES - GENERAL: PAINLEVEL_OUTOF10: 0-NO PAIN

## 2025-01-22 NOTE — LETTER
"January 22, 2025     Fabian Miller DO  [address] [address] [facility]  [address]  [facility] [address]    Patient: Jakob Austin   YOB: 1955   Date of Visit: 1/22/2025       Dear Dr. Fabian Miller DO:    Thank you for referring Jakob Austin to me for evaluation. Below are my notes for this consultation.  If you have questions, please do not hesitate to call me. I look forward to following your patient along with you.       Sincerely,     Venkatesh Santos MD      CC: No Recipients  ______________________________________________________________________________________    History Of Present Illness  Jakob Austin is a 69 y.o. male status post robotic assisted laparoscopic left direct inguinal herniorrhaphy with mesh on January 2, 2025.  On postop day #1 the patient slipped on ice.  He had some discomfort which has improved.  He had difficulty urinating for about 1 week postop.  He was given oral antibiotics by his primary care physician.  His symptoms have now improved.  He also complains of some mild burning discomfort extending from the lower back down the posterior portion of his left thigh to his knee.  He has no abdominal pain.  He did not take any oxycodone postop.  He returned to normal daily activity in 7 days.     Last Recorded Vitals  Blood pressure 152/88, pulse 93, temperature 36.6 °C (97.9 °F), temperature source Temporal, resp. rate 16, height 1.753 m (5' 9\"), weight 98.1 kg (216 lb 3.2 oz), SpO2 98%.    Physical Exam  General: Well-developed, well-nourished, no acute distress, alert and oriented  Wound: Intact, no erythema or signs of infection    Assessment/Plan  Diagnoses and all orders for this visit:  Postop check  Recovering well.  Follow-up as needed.  Follow-up with PCP regarding the posterior thigh discomfort.      Venkatesh Santos MD    "

## 2025-01-29 ENCOUNTER — APPOINTMENT (OUTPATIENT)
Dept: CARDIOLOGY | Facility: HOSPITAL | Age: 70
DRG: 872 | End: 2025-01-29
Payer: MEDICARE

## 2025-01-29 ENCOUNTER — HOSPITAL ENCOUNTER (INPATIENT)
Facility: HOSPITAL | Age: 70
LOS: 3 days | Discharge: HOME | End: 2025-02-02
Attending: STUDENT IN AN ORGANIZED HEALTH CARE EDUCATION/TRAINING PROGRAM | Admitting: STUDENT IN AN ORGANIZED HEALTH CARE EDUCATION/TRAINING PROGRAM
Payer: MEDICARE

## 2025-01-29 ENCOUNTER — APPOINTMENT (OUTPATIENT)
Dept: RADIOLOGY | Facility: HOSPITAL | Age: 70
DRG: 872 | End: 2025-01-29
Payer: MEDICARE

## 2025-01-29 ENCOUNTER — TELEPHONE (OUTPATIENT)
Dept: PRIMARY CARE | Facility: CLINIC | Age: 70
End: 2025-01-29
Payer: MEDICARE

## 2025-01-29 DIAGNOSIS — N39.0 URINARY TRACT INFECTION WITH HEMATURIA, SITE UNSPECIFIED: ICD-10-CM

## 2025-01-29 DIAGNOSIS — N13.30 HYDRONEPHROSIS, UNSPECIFIED HYDRONEPHROSIS TYPE: ICD-10-CM

## 2025-01-29 DIAGNOSIS — R31.9 URINARY TRACT INFECTION WITH HEMATURIA, SITE UNSPECIFIED: ICD-10-CM

## 2025-01-29 DIAGNOSIS — N20.1 CALCULUS OF URETER: ICD-10-CM

## 2025-01-29 DIAGNOSIS — N20.1 URETERAL STONE: Primary | ICD-10-CM

## 2025-01-29 DIAGNOSIS — A41.9 SEPSIS, DUE TO UNSPECIFIED ORGANISM, UNSPECIFIED WHETHER ACUTE ORGAN DYSFUNCTION PRESENT (MULTI): ICD-10-CM

## 2025-01-29 LAB
ALBUMIN SERPL BCP-MCNC: 4.2 G/DL (ref 3.4–5)
ALP SERPL-CCNC: 53 U/L (ref 33–136)
ALT SERPL W P-5'-P-CCNC: 24 U/L (ref 10–52)
AMORPH CRY #/AREA UR COMP ASSIST: ABNORMAL /HPF
ANION GAP SERPL CALC-SCNC: 17 MMOL/L (ref 10–20)
APPEARANCE UR: ABNORMAL
AST SERPL W P-5'-P-CCNC: 32 U/L (ref 9–39)
BACTERIA #/AREA URNS AUTO: ABNORMAL /HPF
BASOPHILS # BLD AUTO: 0.03 X10*3/UL (ref 0–0.1)
BASOPHILS NFR BLD AUTO: 0.2 %
BILIRUB SERPL-MCNC: 1 MG/DL (ref 0–1.2)
BILIRUB UR STRIP.AUTO-MCNC: NEGATIVE MG/DL
BUN SERPL-MCNC: 24 MG/DL (ref 6–23)
CALCIUM SERPL-MCNC: 9 MG/DL (ref 8.6–10.3)
CHLORIDE SERPL-SCNC: 100 MMOL/L (ref 98–107)
CO2 SERPL-SCNC: 23 MMOL/L (ref 21–32)
COLOR UR: YELLOW
CREAT SERPL-MCNC: 1.53 MG/DL (ref 0.5–1.3)
EGFRCR SERPLBLD CKD-EPI 2021: 48 ML/MIN/1.73M*2
EOSINOPHIL # BLD AUTO: 0 X10*3/UL (ref 0–0.4)
EOSINOPHIL NFR BLD AUTO: 0 %
ERYTHROCYTE [DISTWIDTH] IN BLOOD BY AUTOMATED COUNT: 14 % (ref 11.5–14.5)
FLUAV RNA RESP QL NAA+PROBE: NOT DETECTED
FLUBV RNA RESP QL NAA+PROBE: NOT DETECTED
GLUCOSE SERPL-MCNC: 140 MG/DL (ref 74–99)
GLUCOSE UR STRIP.AUTO-MCNC: NORMAL MG/DL
HCT VFR BLD AUTO: 35 % (ref 41–52)
HGB BLD-MCNC: 11.9 G/DL (ref 13.5–17.5)
IMM GRANULOCYTES # BLD AUTO: 0.16 X10*3/UL (ref 0–0.5)
IMM GRANULOCYTES NFR BLD AUTO: 1.1 % (ref 0–0.9)
KETONES UR STRIP.AUTO-MCNC: NEGATIVE MG/DL
LACTATE SERPL-SCNC: 1.2 MMOL/L (ref 0.4–2)
LEUKOCYTE ESTERASE UR QL STRIP.AUTO: ABNORMAL
LIPASE SERPL-CCNC: 13 U/L (ref 9–82)
LYMPHOCYTES # BLD AUTO: 0.31 X10*3/UL (ref 0.8–3)
LYMPHOCYTES NFR BLD AUTO: 2.2 %
MCH RBC QN AUTO: 29.9 PG (ref 26–34)
MCHC RBC AUTO-ENTMCNC: 34 G/DL (ref 32–36)
MCV RBC AUTO: 88 FL (ref 80–100)
MONOCYTES # BLD AUTO: 0.96 X10*3/UL (ref 0.05–0.8)
MONOCYTES NFR BLD AUTO: 6.8 %
MUCOUS THREADS #/AREA URNS AUTO: ABNORMAL /LPF
NEUTROPHILS # BLD AUTO: 12.6 X10*3/UL (ref 1.6–5.5)
NEUTROPHILS NFR BLD AUTO: 89.7 %
NITRITE UR QL STRIP.AUTO: NEGATIVE
NRBC BLD-RTO: 0 /100 WBCS (ref 0–0)
PH UR STRIP.AUTO: 5.5 [PH]
PLATELET # BLD AUTO: 145 X10*3/UL (ref 150–450)
POTASSIUM SERPL-SCNC: 4.2 MMOL/L (ref 3.5–5.3)
PROT SERPL-MCNC: 7.6 G/DL (ref 6.4–8.2)
PROT UR STRIP.AUTO-MCNC: ABNORMAL MG/DL
RBC # BLD AUTO: 3.98 X10*6/UL (ref 4.5–5.9)
RBC # UR STRIP.AUTO: ABNORMAL /UL
RBC #/AREA URNS AUTO: ABNORMAL /HPF
SARS-COV-2 RNA RESP QL NAA+PROBE: NOT DETECTED
SODIUM SERPL-SCNC: 136 MMOL/L (ref 136–145)
SP GR UR STRIP.AUTO: 1.02
SQUAMOUS #/AREA URNS AUTO: ABNORMAL /HPF
URATE CRY #/AREA UR COMP ASSIST: ABNORMAL /HPF
UROBILINOGEN UR STRIP.AUTO-MCNC: NORMAL MG/DL
WBC # BLD AUTO: 14.1 X10*3/UL (ref 4.4–11.3)
WBC #/AREA URNS AUTO: ABNORMAL /HPF
WBC CLUMPS #/AREA URNS AUTO: ABNORMAL /HPF

## 2025-01-29 PROCEDURE — 87075 CULTR BACTERIA EXCEPT BLOOD: CPT | Mod: PARLAB | Performed by: NURSE PRACTITIONER

## 2025-01-29 PROCEDURE — 74177 CT ABD & PELVIS W/CONTRAST: CPT | Performed by: RADIOLOGY

## 2025-01-29 PROCEDURE — 74177 CT ABD & PELVIS W/CONTRAST: CPT

## 2025-01-29 PROCEDURE — 87636 SARSCOV2 & INF A&B AMP PRB: CPT | Performed by: NURSE PRACTITIONER

## 2025-01-29 PROCEDURE — 87077 CULTURE AEROBIC IDENTIFY: CPT | Mod: PARLAB | Performed by: NURSE PRACTITIONER

## 2025-01-29 PROCEDURE — 2550000001 HC RX 255 CONTRASTS: Performed by: STUDENT IN AN ORGANIZED HEALTH CARE EDUCATION/TRAINING PROGRAM

## 2025-01-29 PROCEDURE — 83690 ASSAY OF LIPASE: CPT | Performed by: NURSE PRACTITIONER

## 2025-01-29 PROCEDURE — 2500000004 HC RX 250 GENERAL PHARMACY W/ HCPCS (ALT 636 FOR OP/ED): Performed by: STUDENT IN AN ORGANIZED HEALTH CARE EDUCATION/TRAINING PROGRAM

## 2025-01-29 PROCEDURE — 36415 COLL VENOUS BLD VENIPUNCTURE: CPT | Performed by: NURSE PRACTITIONER

## 2025-01-29 PROCEDURE — 81001 URINALYSIS AUTO W/SCOPE: CPT | Performed by: NURSE PRACTITIONER

## 2025-01-29 PROCEDURE — 96365 THER/PROPH/DIAG IV INF INIT: CPT

## 2025-01-29 PROCEDURE — 80053 COMPREHEN METABOLIC PANEL: CPT | Performed by: NURSE PRACTITIONER

## 2025-01-29 PROCEDURE — 83605 ASSAY OF LACTIC ACID: CPT | Performed by: NURSE PRACTITIONER

## 2025-01-29 PROCEDURE — 99285 EMERGENCY DEPT VISIT HI MDM: CPT | Mod: 25 | Performed by: STUDENT IN AN ORGANIZED HEALTH CARE EDUCATION/TRAINING PROGRAM

## 2025-01-29 PROCEDURE — 2500000001 HC RX 250 WO HCPCS SELF ADMINISTERED DRUGS (ALT 637 FOR MEDICARE OP): Performed by: STUDENT IN AN ORGANIZED HEALTH CARE EDUCATION/TRAINING PROGRAM

## 2025-01-29 PROCEDURE — 93005 ELECTROCARDIOGRAM TRACING: CPT

## 2025-01-29 PROCEDURE — 85025 COMPLETE CBC W/AUTO DIFF WBC: CPT | Performed by: NURSE PRACTITIONER

## 2025-01-29 RX ORDER — MORPHINE SULFATE 4 MG/ML
4 INJECTION, SOLUTION INTRAMUSCULAR; INTRAVENOUS ONCE
Status: COMPLETED | OUTPATIENT
Start: 2025-01-29 | End: 2025-01-30

## 2025-01-29 RX ORDER — ONDANSETRON HYDROCHLORIDE 2 MG/ML
4 INJECTION, SOLUTION INTRAVENOUS ONCE
Status: COMPLETED | OUTPATIENT
Start: 2025-01-29 | End: 2025-01-30

## 2025-01-29 RX ORDER — ACETAMINOPHEN 325 MG/1
650 TABLET ORAL EVERY 6 HOURS PRN
Status: DISCONTINUED | OUTPATIENT
Start: 2025-01-29 | End: 2025-01-30

## 2025-01-29 RX ADMIN — PIPERACILLIN SODIUM AND TAZOBACTAM SODIUM 3.38 G: 3; .375 INJECTION, SOLUTION INTRAVENOUS at 22:29

## 2025-01-29 RX ADMIN — ACETAMINOPHEN 650 MG: 325 TABLET, FILM COATED ORAL at 22:29

## 2025-01-29 RX ADMIN — IOHEXOL 75 ML: 350 INJECTION, SOLUTION INTRAVENOUS at 22:17

## 2025-01-29 ASSESSMENT — COLUMBIA-SUICIDE SEVERITY RATING SCALE - C-SSRS
2. HAVE YOU ACTUALLY HAD ANY THOUGHTS OF KILLING YOURSELF?: NO
1. IN THE PAST MONTH, HAVE YOU WISHED YOU WERE DEAD OR WISHED YOU COULD GO TO SLEEP AND NOT WAKE UP?: NO
6. HAVE YOU EVER DONE ANYTHING, STARTED TO DO ANYTHING, OR PREPARED TO DO ANYTHING TO END YOUR LIFE?: NO

## 2025-01-29 ASSESSMENT — PAIN - FUNCTIONAL ASSESSMENT
PAIN_FUNCTIONAL_ASSESSMENT: 0-10
PAIN_FUNCTIONAL_ASSESSMENT: 0-10

## 2025-01-29 ASSESSMENT — PAIN DESCRIPTION - LOCATION
LOCATION: ABDOMEN

## 2025-01-29 ASSESSMENT — PAIN DESCRIPTION - PAIN TYPE: TYPE: ACUTE PAIN

## 2025-01-29 ASSESSMENT — PAIN SCALES - GENERAL
PAINLEVEL_OUTOF10: 5 - MODERATE PAIN
PAINLEVEL_OUTOF10: 7
PAINLEVEL_OUTOF10: 5 - MODERATE PAIN

## 2025-01-29 ASSESSMENT — PAIN DESCRIPTION - DESCRIPTORS: DESCRIPTORS: ACHING

## 2025-01-29 NOTE — TELEPHONE ENCOUNTER
Pt called again regarding illness.  Would like rx to [phone]  No allergies.  Stomach tightness, chills, slight fever.  Covid negative  Please advise  Ty

## 2025-01-29 NOTE — Clinical Note
The site was marked. Prepped with: Betadine. The site was clipped. The patient was draped. Left back flank

## 2025-01-29 NOTE — TELEPHONE ENCOUNTER
Pt here last may -wanted to get your advise-having terrible chills, in bed for 2 days & a lot of stomach pain-no diarrhea.  No sinus, fever or anything.  Any rx for this?  Arie [phone]  No allergies  Ty

## 2025-01-30 ENCOUNTER — ANESTHESIA (OUTPATIENT)
Dept: OPERATING ROOM | Facility: HOSPITAL | Age: 70
End: 2025-01-30
Payer: MEDICARE

## 2025-01-30 ENCOUNTER — APPOINTMENT (OUTPATIENT)
Dept: PRIMARY CARE | Facility: CLINIC | Age: 70
End: 2025-01-30
Payer: MEDICARE

## 2025-01-30 ENCOUNTER — ANESTHESIA EVENT (OUTPATIENT)
Dept: OPERATING ROOM | Facility: HOSPITAL | Age: 70
End: 2025-01-30
Payer: MEDICARE

## 2025-01-30 ENCOUNTER — APPOINTMENT (OUTPATIENT)
Dept: RADIOLOGY | Facility: HOSPITAL | Age: 70
DRG: 872 | End: 2025-01-30
Payer: MEDICARE

## 2025-01-30 PROBLEM — N20.1 URETERAL STONE: Status: ACTIVE | Noted: 2025-01-30

## 2025-01-30 PROBLEM — A41.9 SEPSIS (MULTI): Status: ACTIVE | Noted: 2025-01-29

## 2025-01-30 PROBLEM — N20.1 CALCULUS OF URETER: Status: ACTIVE | Noted: 2025-01-29

## 2025-01-30 LAB
ANION GAP SERPL CALC-SCNC: 11 MMOL/L (ref 10–20)
BASOPHILS # BLD AUTO: 0.02 X10*3/UL (ref 0–0.1)
BASOPHILS NFR BLD AUTO: 0.2 %
BUN SERPL-MCNC: 21 MG/DL (ref 6–23)
CALCIUM SERPL-MCNC: 8.2 MG/DL (ref 8.6–10.3)
CHLORIDE SERPL-SCNC: 103 MMOL/L (ref 98–107)
CO2 SERPL-SCNC: 26 MMOL/L (ref 21–32)
CREAT SERPL-MCNC: 1.42 MG/DL (ref 0.5–1.3)
EGFRCR SERPLBLD CKD-EPI 2021: 53 ML/MIN/1.73M*2
EOSINOPHIL # BLD AUTO: 0 X10*3/UL (ref 0–0.4)
EOSINOPHIL NFR BLD AUTO: 0 %
ERYTHROCYTE [DISTWIDTH] IN BLOOD BY AUTOMATED COUNT: 14.2 % (ref 11.5–14.5)
GLUCOSE SERPL-MCNC: 189 MG/DL (ref 74–99)
HCT VFR BLD AUTO: 33.2 % (ref 41–52)
HGB BLD-MCNC: 11.3 G/DL (ref 13.5–17.5)
HOLD SPECIMEN: NORMAL
IMM GRANULOCYTES # BLD AUTO: 0.07 X10*3/UL (ref 0–0.5)
IMM GRANULOCYTES NFR BLD AUTO: 0.7 % (ref 0–0.9)
INR PPP: 1.4 (ref 0.9–1.1)
LYMPHOCYTES # BLD AUTO: 0.31 X10*3/UL (ref 0.8–3)
LYMPHOCYTES NFR BLD AUTO: 3 %
MAGNESIUM SERPL-MCNC: 1.69 MG/DL (ref 1.6–2.4)
MCH RBC QN AUTO: 30.3 PG (ref 26–34)
MCHC RBC AUTO-ENTMCNC: 34 G/DL (ref 32–36)
MCV RBC AUTO: 89 FL (ref 80–100)
MONOCYTES # BLD AUTO: 0.74 X10*3/UL (ref 0.05–0.8)
MONOCYTES NFR BLD AUTO: 7.1 %
NEUTROPHILS # BLD AUTO: 9.21 X10*3/UL (ref 1.6–5.5)
NEUTROPHILS NFR BLD AUTO: 89 %
NRBC BLD-RTO: 0 /100 WBCS (ref 0–0)
PLATELET # BLD AUTO: 144 X10*3/UL (ref 150–450)
POTASSIUM SERPL-SCNC: 3.4 MMOL/L (ref 3.5–5.3)
PROTHROMBIN TIME: 16.3 SECONDS (ref 9.8–12.8)
RBC # BLD AUTO: 3.73 X10*6/UL (ref 4.5–5.9)
SODIUM SERPL-SCNC: 137 MMOL/L (ref 136–145)
WBC # BLD AUTO: 10.4 X10*3/UL (ref 4.4–11.3)

## 2025-01-30 PROCEDURE — 2780000003 HC OR 278 NO HCPCS: Performed by: UROLOGY

## 2025-01-30 PROCEDURE — 2060000001 HC INTERMEDIATE ICU ROOM DAILY

## 2025-01-30 PROCEDURE — 2500000002 HC RX 250 W HCPCS SELF ADMINISTERED DRUGS (ALT 637 FOR MEDICARE OP, ALT 636 FOR OP/ED)

## 2025-01-30 PROCEDURE — 7100000002 HC RECOVERY ROOM TIME - EACH INCREMENTAL 1 MINUTE: Performed by: UROLOGY

## 2025-01-30 PROCEDURE — 76000 FLUOROSCOPY <1 HR PHYS/QHP: CPT

## 2025-01-30 PROCEDURE — 2500000004 HC RX 250 GENERAL PHARMACY W/ HCPCS (ALT 636 FOR OP/ED)

## 2025-01-30 PROCEDURE — 2550000001 HC RX 255 CONTRASTS: Performed by: STUDENT IN AN ORGANIZED HEALTH CARE EDUCATION/TRAINING PROGRAM

## 2025-01-30 PROCEDURE — 96375 TX/PRO/DX INJ NEW DRUG ADDON: CPT | Mod: 59

## 2025-01-30 PROCEDURE — 84520 ASSAY OF UREA NITROGEN: CPT | Performed by: STUDENT IN AN ORGANIZED HEALTH CARE EDUCATION/TRAINING PROGRAM

## 2025-01-30 PROCEDURE — 3600000007 HC OR TIME - EACH INCREMENTAL 1 MINUTE - PROCEDURE LEVEL TWO: Performed by: UROLOGY

## 2025-01-30 PROCEDURE — 99223 1ST HOSP IP/OBS HIGH 75: CPT | Performed by: STUDENT IN AN ORGANIZED HEALTH CARE EDUCATION/TRAINING PROGRAM

## 2025-01-30 PROCEDURE — A52005 PR CYSTOURETHROSCOPY,URETER CATHETER

## 2025-01-30 PROCEDURE — 2500000004 HC RX 250 GENERAL PHARMACY W/ HCPCS (ALT 636 FOR OP/ED): Performed by: STUDENT IN AN ORGANIZED HEALTH CARE EDUCATION/TRAINING PROGRAM

## 2025-01-30 PROCEDURE — 99100 ANES PT EXTEME AGE<1 YR&>70: CPT | Performed by: ANESTHESIOLOGY

## 2025-01-30 PROCEDURE — 2500000005 HC RX 250 GENERAL PHARMACY W/O HCPCS: Performed by: STUDENT IN AN ORGANIZED HEALTH CARE EDUCATION/TRAINING PROGRAM

## 2025-01-30 PROCEDURE — 3700000001 HC GENERAL ANESTHESIA TIME - INITIAL BASE CHARGE: Performed by: UROLOGY

## 2025-01-30 PROCEDURE — 3700000002 HC GENERAL ANESTHESIA TIME - EACH INCREMENTAL 1 MINUTE: Performed by: UROLOGY

## 2025-01-30 PROCEDURE — 50432 PLMT NEPHROSTOMY CATHETER: CPT

## 2025-01-30 PROCEDURE — 2500000001 HC RX 250 WO HCPCS SELF ADMINISTERED DRUGS (ALT 637 FOR MEDICARE OP): Performed by: STUDENT IN AN ORGANIZED HEALTH CARE EDUCATION/TRAINING PROGRAM

## 2025-01-30 PROCEDURE — C1769 GUIDE WIRE: HCPCS | Performed by: UROLOGY

## 2025-01-30 PROCEDURE — 2500000005 HC RX 250 GENERAL PHARMACY W/O HCPCS: Performed by: UROLOGY

## 2025-01-30 PROCEDURE — C2617 STENT, NON-COR, TEM W/O DEL: HCPCS | Performed by: UROLOGY

## 2025-01-30 PROCEDURE — 2720000007 HC OR 272 NO HCPCS: Performed by: UROLOGY

## 2025-01-30 PROCEDURE — 2500000004 HC RX 250 GENERAL PHARMACY W/ HCPCS (ALT 636 FOR OP/ED): Performed by: UROLOGY

## 2025-01-30 PROCEDURE — 85025 COMPLETE CBC W/AUTO DIFF WBC: CPT | Performed by: STUDENT IN AN ORGANIZED HEALTH CARE EDUCATION/TRAINING PROGRAM

## 2025-01-30 PROCEDURE — 36415 COLL VENOUS BLD VENIPUNCTURE: CPT | Performed by: RADIOLOGY

## 2025-01-30 PROCEDURE — 96376 TX/PRO/DX INJ SAME DRUG ADON: CPT | Mod: 59

## 2025-01-30 PROCEDURE — 3600000002 HC OR TIME - INITIAL BASE CHARGE - PROCEDURE LEVEL TWO: Performed by: UROLOGY

## 2025-01-30 PROCEDURE — A52005 PR CYSTOURETHROSCOPY,URETER CATHETER: Performed by: ANESTHESIOLOGY

## 2025-01-30 PROCEDURE — 2720000007 HC OR 272 NO HCPCS

## 2025-01-30 PROCEDURE — 83735 ASSAY OF MAGNESIUM: CPT | Performed by: STUDENT IN AN ORGANIZED HEALTH CARE EDUCATION/TRAINING PROGRAM

## 2025-01-30 PROCEDURE — C1748 ENDOSCOPE, SINGLE, UGI: HCPCS | Performed by: UROLOGY

## 2025-01-30 PROCEDURE — C1894 INTRO/SHEATH, NON-LASER: HCPCS

## 2025-01-30 PROCEDURE — 85610 PROTHROMBIN TIME: CPT | Performed by: RADIOLOGY

## 2025-01-30 PROCEDURE — 50431 NJX PX NFROSGRM &/URTRGRM: CPT

## 2025-01-30 PROCEDURE — 36415 COLL VENOUS BLD VENIPUNCTURE: CPT | Performed by: STUDENT IN AN ORGANIZED HEALTH CARE EDUCATION/TRAINING PROGRAM

## 2025-01-30 PROCEDURE — 76942 ECHO GUIDE FOR BIOPSY: CPT

## 2025-01-30 PROCEDURE — 0T9430Z DRAINAGE OF LEFT KIDNEY PELVIS WITH DRAINAGE DEVICE, PERCUTANEOUS APPROACH: ICD-10-PCS | Performed by: RADIOLOGY

## 2025-01-30 PROCEDURE — 7100000001 HC RECOVERY ROOM TIME - INITIAL BASE CHARGE: Performed by: UROLOGY

## 2025-01-30 PROCEDURE — 0TJB8ZZ INSPECTION OF BLADDER, VIA NATURAL OR ARTIFICIAL OPENING ENDOSCOPIC: ICD-10-PCS | Performed by: UROLOGY

## 2025-01-30 PROCEDURE — 51798 US URINE CAPACITY MEASURE: CPT

## 2025-01-30 PROCEDURE — C1769 GUIDE WIRE: HCPCS

## 2025-01-30 PROCEDURE — 2500000004 HC RX 250 GENERAL PHARMACY W/ HCPCS (ALT 636 FOR OP/ED): Performed by: RADIOLOGY

## 2025-01-30 PROCEDURE — C1729 CATH, DRAINAGE: HCPCS

## 2025-01-30 RX ORDER — FENTANYL CITRATE 50 UG/ML
INJECTION, SOLUTION INTRAMUSCULAR; INTRAVENOUS AS NEEDED
Status: DISCONTINUED | OUTPATIENT
Start: 2025-01-30 | End: 2025-01-30

## 2025-01-30 RX ORDER — LIDOCAINE HCL/PF 100 MG/5ML
SYRINGE (ML) INTRAVENOUS AS NEEDED
Status: DISCONTINUED | OUTPATIENT
Start: 2025-01-30 | End: 2025-01-30

## 2025-01-30 RX ORDER — SODIUM CHLORIDE 0.9 G/100ML
INJECTION, SOLUTION IRRIGATION AS NEEDED
Status: DISCONTINUED | OUTPATIENT
Start: 2025-01-30 | End: 2025-01-30 | Stop reason: HOSPADM

## 2025-01-30 RX ORDER — KETOROLAC TROMETHAMINE 30 MG/ML
15 INJECTION, SOLUTION INTRAMUSCULAR; INTRAVENOUS ONCE
Status: DISCONTINUED | OUTPATIENT
Start: 2025-01-30 | End: 2025-01-30

## 2025-01-30 RX ORDER — POTASSIUM CHLORIDE 20 MEQ/1
40 TABLET, EXTENDED RELEASE ORAL ONCE
Status: COMPLETED | OUTPATIENT
Start: 2025-01-30 | End: 2025-01-30

## 2025-01-30 RX ORDER — MORPHINE SULFATE 2 MG/ML
2 INJECTION, SOLUTION INTRAMUSCULAR; INTRAVENOUS EVERY 4 HOURS PRN
Status: DISCONTINUED | OUTPATIENT
Start: 2025-01-30 | End: 2025-02-01

## 2025-01-30 RX ORDER — SODIUM CHLORIDE, SODIUM LACTATE, POTASSIUM CHLORIDE, CALCIUM CHLORIDE 600; 310; 30; 20 MG/100ML; MG/100ML; MG/100ML; MG/100ML
125 INJECTION, SOLUTION INTRAVENOUS CONTINUOUS
Status: ACTIVE | OUTPATIENT
Start: 2025-01-30 | End: 2025-01-31

## 2025-01-30 RX ORDER — FAMOTIDINE 20 MG/1
20 TABLET, FILM COATED ORAL 2 TIMES DAILY
Status: DISCONTINUED | OUTPATIENT
Start: 2025-01-30 | End: 2025-02-02 | Stop reason: HOSPADM

## 2025-01-30 RX ORDER — ATORVASTATIN CALCIUM 40 MG/1
40 TABLET, FILM COATED ORAL DAILY
Status: DISCONTINUED | OUTPATIENT
Start: 2025-01-31 | End: 2025-02-02 | Stop reason: HOSPADM

## 2025-01-30 RX ORDER — ACETAMINOPHEN 325 MG/1
650 TABLET ORAL EVERY 4 HOURS PRN
Status: DISCONTINUED | OUTPATIENT
Start: 2025-01-30 | End: 2025-01-30 | Stop reason: HOSPADM

## 2025-01-30 RX ORDER — VANCOMYCIN HYDROCHLORIDE 1 G/20ML
INJECTION, POWDER, LYOPHILIZED, FOR SOLUTION INTRAVENOUS DAILY PRN
Status: DISCONTINUED | OUTPATIENT
Start: 2025-01-30 | End: 2025-02-01

## 2025-01-30 RX ORDER — ALBUTEROL SULFATE 0.83 MG/ML
2.5 SOLUTION RESPIRATORY (INHALATION) ONCE AS NEEDED
Status: DISCONTINUED | OUTPATIENT
Start: 2025-01-30 | End: 2025-01-30 | Stop reason: HOSPADM

## 2025-01-30 RX ORDER — LANOLIN ALCOHOL/MO/W.PET/CERES
500 CREAM (GRAM) TOPICAL DAILY
COMMUNITY

## 2025-01-30 RX ORDER — ACETAMINOPHEN 325 MG/1
650 TABLET ORAL EVERY 4 HOURS PRN
Status: DISCONTINUED | OUTPATIENT
Start: 2025-01-30 | End: 2025-02-02 | Stop reason: HOSPADM

## 2025-01-30 RX ORDER — MEPERIDINE HYDROCHLORIDE 50 MG/ML
12.5 INJECTION INTRAMUSCULAR; INTRAVENOUS; SUBCUTANEOUS EVERY 10 MIN PRN
Status: DISCONTINUED | OUTPATIENT
Start: 2025-01-30 | End: 2025-01-30 | Stop reason: HOSPADM

## 2025-01-30 RX ORDER — FLAXSEED OIL 1000 MG
1000 CAPSULE ORAL DAILY
COMMUNITY

## 2025-01-30 RX ORDER — METOPROLOL SUCCINATE 25 MG/1
25 TABLET, EXTENDED RELEASE ORAL DAILY
Status: DISCONTINUED | OUTPATIENT
Start: 2025-01-30 | End: 2025-01-30

## 2025-01-30 RX ORDER — MEROPENEM 1 G/1
1 INJECTION, POWDER, FOR SOLUTION INTRAVENOUS EVERY 8 HOURS
Status: DISCONTINUED | OUTPATIENT
Start: 2025-01-30 | End: 2025-01-31

## 2025-01-30 RX ORDER — GUAIFENESIN AND PHENYLEPHRINE HCL 400; 10 MG/1; MG/1
1 TABLET ORAL DAILY
COMMUNITY

## 2025-01-30 RX ORDER — HEPARIN SODIUM 5000 [USP'U]/ML
5000 INJECTION, SOLUTION INTRAVENOUS; SUBCUTANEOUS EVERY 8 HOURS
Status: DISCONTINUED | OUTPATIENT
Start: 2025-01-31 | End: 2025-02-02 | Stop reason: HOSPADM

## 2025-01-30 RX ORDER — ALUMINUM HYDROXIDE, MAGNESIUM HYDROXIDE, AND SIMETHICONE 1200; 120; 1200 MG/30ML; MG/30ML; MG/30ML
10 SUSPENSION ORAL 4 TIMES DAILY PRN
Status: DISCONTINUED | OUTPATIENT
Start: 2025-01-30 | End: 2025-02-02 | Stop reason: HOSPADM

## 2025-01-30 RX ORDER — FENTANYL CITRATE 50 UG/ML
INJECTION, SOLUTION INTRAMUSCULAR; INTRAVENOUS
Status: COMPLETED | OUTPATIENT
Start: 2025-01-30 | End: 2025-01-30

## 2025-01-30 RX ORDER — ACETAMINOPHEN 325 MG/1
975 TABLET ORAL ONCE
Status: COMPLETED | OUTPATIENT
Start: 2025-01-30 | End: 2025-01-30

## 2025-01-30 RX ORDER — ASPIRIN 81 MG/1
81 TABLET ORAL DAILY
Status: DISCONTINUED | OUTPATIENT
Start: 2025-01-31 | End: 2025-02-02 | Stop reason: HOSPADM

## 2025-01-30 RX ORDER — MIDAZOLAM HYDROCHLORIDE 1 MG/ML
INJECTION, SOLUTION INTRAMUSCULAR; INTRAVENOUS AS NEEDED
Status: DISCONTINUED | OUTPATIENT
Start: 2025-01-30 | End: 2025-01-30

## 2025-01-30 RX ORDER — ONDANSETRON HYDROCHLORIDE 2 MG/ML
4 INJECTION, SOLUTION INTRAVENOUS EVERY 6 HOURS PRN
Status: DISCONTINUED | OUTPATIENT
Start: 2025-01-30 | End: 2025-02-02 | Stop reason: HOSPADM

## 2025-01-30 RX ORDER — ONDANSETRON HYDROCHLORIDE 2 MG/ML
4 INJECTION, SOLUTION INTRAVENOUS ONCE AS NEEDED
Status: DISCONTINUED | OUTPATIENT
Start: 2025-01-30 | End: 2025-01-30 | Stop reason: HOSPADM

## 2025-01-30 RX ORDER — PROPOFOL 10 MG/ML
INJECTION, EMULSION INTRAVENOUS AS NEEDED
Status: DISCONTINUED | OUTPATIENT
Start: 2025-01-30 | End: 2025-01-30

## 2025-01-30 RX ORDER — KETOROLAC TROMETHAMINE 30 MG/ML
INJECTION, SOLUTION INTRAMUSCULAR; INTRAVENOUS AS NEEDED
Status: DISCONTINUED | OUTPATIENT
Start: 2025-01-30 | End: 2025-01-30

## 2025-01-30 RX ORDER — MORPHINE SULFATE 4 MG/ML
4 INJECTION, SOLUTION INTRAMUSCULAR; INTRAVENOUS ONCE
Status: COMPLETED | OUTPATIENT
Start: 2025-01-30 | End: 2025-01-30

## 2025-01-30 RX ORDER — DEXAMETHASONE SODIUM PHOSPHATE 10 MG/ML
6 INJECTION INTRAMUSCULAR; INTRAVENOUS ONCE
Status: DISCONTINUED | OUTPATIENT
Start: 2025-01-30 | End: 2025-01-30 | Stop reason: HOSPADM

## 2025-01-30 RX ORDER — MULTIVIT-MIN/IRON FUM/FOLIC AC 7.5 MG-4
1 TABLET ORAL DAILY
COMMUNITY

## 2025-01-30 RX ORDER — NAPROXEN SODIUM 220 MG/1
324 TABLET, FILM COATED ORAL ONCE
Status: COMPLETED | OUTPATIENT
Start: 2025-01-30 | End: 2025-01-30

## 2025-01-30 RX ADMIN — HEPARIN SODIUM 5000 UNITS: 5000 INJECTION INTRAVENOUS; SUBCUTANEOUS at 22:38

## 2025-01-30 RX ADMIN — MORPHINE SULFATE 4 MG: 4 INJECTION, SOLUTION INTRAMUSCULAR; INTRAVENOUS at 00:12

## 2025-01-30 RX ADMIN — PROPOFOL 200 MG: 10 INJECTION, EMULSION INTRAVENOUS at 11:11

## 2025-01-30 RX ADMIN — ONDANSETRON 4 MG: 2 INJECTION INTRAMUSCULAR; INTRAVENOUS at 00:12

## 2025-01-30 RX ADMIN — ACETAMINOPHEN 975 MG: 325 TABLET, FILM COATED ORAL at 03:08

## 2025-01-30 RX ADMIN — VANCOMYCIN HYDROCHLORIDE 2000 MG: 10 INJECTION, POWDER, LYOPHILIZED, FOR SOLUTION INTRAVENOUS at 04:00

## 2025-01-30 RX ADMIN — LIDOCAINE HYDROCHLORIDE 100 MG: 20 INJECTION, SOLUTION INTRAVENOUS at 11:11

## 2025-01-30 RX ADMIN — FENTANYL CITRATE 50 MCG: 50 INJECTION, SOLUTION INTRAMUSCULAR; INTRAVENOUS at 11:11

## 2025-01-30 RX ADMIN — IOHEXOL 10 ML: 350 INJECTION, SOLUTION INTRAVENOUS at 15:19

## 2025-01-30 RX ADMIN — POTASSIUM CHLORIDE 40 MEQ: 1500 TABLET, EXTENDED RELEASE ORAL at 08:25

## 2025-01-30 RX ADMIN — SODIUM CHLORIDE 1000 ML: 9 INJECTION, SOLUTION INTRAVENOUS at 00:12

## 2025-01-30 RX ADMIN — DEXAMETHASONE SODIUM PHOSPHATE 4 MG: 4 INJECTION, SOLUTION INTRAMUSCULAR; INTRAVENOUS at 11:15

## 2025-01-30 RX ADMIN — SODIUM CHLORIDE, SODIUM LACTATE, POTASSIUM CHLORIDE, AND CALCIUM CHLORIDE: .6; .31; .03; .02 INJECTION, SOLUTION INTRAVENOUS at 11:07

## 2025-01-30 RX ADMIN — SODIUM CHLORIDE, SODIUM LACTATE, POTASSIUM CHLORIDE, AND CALCIUM CHLORIDE: .6; .31; .03; .02 INJECTION, SOLUTION INTRAVENOUS at 11:42

## 2025-01-30 RX ADMIN — SODIUM CHLORIDE, SODIUM LACTATE, POTASSIUM CHLORIDE, AND CALCIUM CHLORIDE 125 ML/HR: 600; 310; 30; 20 INJECTION, SOLUTION INTRAVENOUS at 04:27

## 2025-01-30 RX ADMIN — FAMOTIDINE 20 MG: 20 TABLET, FILM COATED ORAL at 20:18

## 2025-01-30 RX ADMIN — FENTANYL CITRATE 50 MCG: 50 INJECTION, SOLUTION INTRAMUSCULAR; INTRAVENOUS at 11:16

## 2025-01-30 RX ADMIN — MORPHINE SULFATE 4 MG: 4 INJECTION, SOLUTION INTRAMUSCULAR; INTRAVENOUS at 02:16

## 2025-01-30 RX ADMIN — ONDANSETRON 4 MG: 2 INJECTION INTRAMUSCULAR; INTRAVENOUS at 11:20

## 2025-01-30 RX ADMIN — FENTANYL CITRATE 50 MCG: 50 INJECTION, SOLUTION INTRAMUSCULAR; INTRAVENOUS at 15:04

## 2025-01-30 RX ADMIN — SODIUM CHLORIDE, SODIUM LACTATE, POTASSIUM CHLORIDE, AND CALCIUM CHLORIDE 125 ML/HR: 600; 310; 30; 20 INJECTION, SOLUTION INTRAVENOUS at 21:57

## 2025-01-30 RX ADMIN — MEROPENEM 1 G: 1 INJECTION, POWDER, FOR SOLUTION INTRAVENOUS at 03:19

## 2025-01-30 RX ADMIN — ALUMINUM HYDROXIDE, MAGNESIUM HYDROXIDE, AND DIMETHICONE 10 ML: 200; 20; 200 SUSPENSION ORAL at 20:18

## 2025-01-30 RX ADMIN — SODIUM CHLORIDE, POTASSIUM CHLORIDE, SODIUM LACTATE AND CALCIUM CHLORIDE 1000 ML: 600; 310; 30; 20 INJECTION, SOLUTION INTRAVENOUS at 03:14

## 2025-01-30 RX ADMIN — MEROPENEM 1 G: 1 INJECTION, POWDER, FOR SOLUTION INTRAVENOUS at 18:53

## 2025-01-30 RX ADMIN — ASPIRIN 81 MG CHEWABLE TABLET 324 MG: 81 TABLET CHEWABLE at 03:26

## 2025-01-30 RX ADMIN — KETOROLAC TROMETHAMINE 30 MG: 30 INJECTION, SOLUTION INTRAMUSCULAR at 11:20

## 2025-01-30 RX ADMIN — MIDAZOLAM 2 MG: 1 INJECTION INTRAMUSCULAR; INTRAVENOUS at 11:08

## 2025-01-30 SDOH — SOCIAL STABILITY: SOCIAL INSECURITY: DOES ANYONE TRY TO KEEP YOU FROM HAVING/CONTACTING OTHER FRIENDS OR DOING THINGS OUTSIDE YOUR HOME?: NO

## 2025-01-30 SDOH — ECONOMIC STABILITY: INCOME INSECURITY: IN THE PAST 12 MONTHS HAS THE ELECTRIC, GAS, OIL, OR WATER COMPANY THREATENED TO SHUT OFF SERVICES IN YOUR HOME?: NO

## 2025-01-30 SDOH — ECONOMIC STABILITY: HOUSING INSECURITY: AT ANY TIME IN THE PAST 12 MONTHS, WERE YOU HOMELESS OR LIVING IN A SHELTER (INCLUDING NOW)?: NO

## 2025-01-30 SDOH — SOCIAL STABILITY: SOCIAL INSECURITY: ABUSE: ADULT

## 2025-01-30 SDOH — ECONOMIC STABILITY: HOUSING INSECURITY: IN THE LAST 12 MONTHS, WAS THERE A TIME WHEN YOU WERE NOT ABLE TO PAY THE MORTGAGE OR RENT ON TIME?: NO

## 2025-01-30 SDOH — SOCIAL STABILITY: SOCIAL INSECURITY: WITHIN THE LAST YEAR, HAVE YOU BEEN AFRAID OF YOUR PARTNER OR EX-PARTNER?: NO

## 2025-01-30 SDOH — ECONOMIC STABILITY: HOUSING INSECURITY: IN THE PAST 12 MONTHS, HOW MANY TIMES HAVE YOU MOVED WHERE YOU WERE LIVING?: 0

## 2025-01-30 SDOH — ECONOMIC STABILITY: FOOD INSECURITY: WITHIN THE PAST 12 MONTHS, YOU WORRIED THAT YOUR FOOD WOULD RUN OUT BEFORE YOU GOT THE MONEY TO BUY MORE.: NEVER TRUE

## 2025-01-30 SDOH — SOCIAL STABILITY: SOCIAL INSECURITY
WITHIN THE LAST YEAR, HAVE YOU BEEN RAPED OR FORCED TO HAVE ANY KIND OF SEXUAL ACTIVITY BY YOUR PARTNER OR EX-PARTNER?: NO

## 2025-01-30 SDOH — SOCIAL STABILITY: SOCIAL INSECURITY: DO YOU FEEL ANYONE HAS EXPLOITED OR TAKEN ADVANTAGE OF YOU FINANCIALLY OR OF YOUR PERSONAL PROPERTY?: NO

## 2025-01-30 SDOH — ECONOMIC STABILITY: FOOD INSECURITY: WITHIN THE PAST 12 MONTHS, THE FOOD YOU BOUGHT JUST DIDN'T LAST AND YOU DIDN'T HAVE MONEY TO GET MORE.: NEVER TRUE

## 2025-01-30 SDOH — SOCIAL STABILITY: SOCIAL INSECURITY: HAVE YOU HAD ANY THOUGHTS OF HARMING ANYONE ELSE?: NO

## 2025-01-30 SDOH — SOCIAL STABILITY: SOCIAL INSECURITY
WITHIN THE LAST YEAR, HAVE YOU BEEN KICKED, HIT, SLAPPED, OR OTHERWISE PHYSICALLY HURT BY YOUR PARTNER OR EX-PARTNER?: NO

## 2025-01-30 SDOH — SOCIAL STABILITY: SOCIAL INSECURITY: HAS ANYONE EVER THREATENED TO HURT YOUR FAMILY OR YOUR PETS?: NO

## 2025-01-30 SDOH — SOCIAL STABILITY: SOCIAL INSECURITY: ARE YOU OR HAVE YOU BEEN THREATENED OR ABUSED PHYSICALLY, EMOTIONALLY, OR SEXUALLY BY ANYONE?: NO

## 2025-01-30 SDOH — ECONOMIC STABILITY: FOOD INSECURITY: HOW HARD IS IT FOR YOU TO PAY FOR THE VERY BASICS LIKE FOOD, HOUSING, MEDICAL CARE, AND HEATING?: NOT VERY HARD

## 2025-01-30 SDOH — SOCIAL STABILITY: SOCIAL INSECURITY: WITHIN THE LAST YEAR, HAVE YOU BEEN HUMILIATED OR EMOTIONALLY ABUSED IN OTHER WAYS BY YOUR PARTNER OR EX-PARTNER?: NO

## 2025-01-30 SDOH — SOCIAL STABILITY: SOCIAL INSECURITY: WERE YOU ABLE TO COMPLETE ALL THE BEHAVIORAL HEALTH SCREENINGS?: YES

## 2025-01-30 SDOH — SOCIAL STABILITY: SOCIAL INSECURITY: ARE THERE ANY APPARENT SIGNS OF INJURIES/BEHAVIORS THAT COULD BE RELATED TO ABUSE/NEGLECT?: NO

## 2025-01-30 SDOH — SOCIAL STABILITY: SOCIAL INSECURITY: HAVE YOU HAD THOUGHTS OF HARMING ANYONE ELSE?: NO

## 2025-01-30 SDOH — ECONOMIC STABILITY: TRANSPORTATION INSECURITY: IN THE PAST 12 MONTHS, HAS LACK OF TRANSPORTATION KEPT YOU FROM MEDICAL APPOINTMENTS OR FROM GETTING MEDICATIONS?: NO

## 2025-01-30 SDOH — HEALTH STABILITY: MENTAL HEALTH: CURRENT SMOKER: 0

## 2025-01-30 SDOH — SOCIAL STABILITY: SOCIAL INSECURITY: DO YOU FEEL UNSAFE GOING BACK TO THE PLACE WHERE YOU ARE LIVING?: NO

## 2025-01-30 ASSESSMENT — PAIN SCALES - GENERAL
PAINLEVEL_OUTOF10: 0 - NO PAIN
PAINLEVEL_OUTOF10: 6
PAINLEVEL_OUTOF10: 0 - NO PAIN
PAINLEVEL_OUTOF10: 6
PAINLEVEL_OUTOF10: 8
PAINLEVEL_OUTOF10: 0 - NO PAIN
PAIN_LEVEL: 0
PAINLEVEL_OUTOF10: 0 - NO PAIN
PAINLEVEL_OUTOF10: 0 - NO PAIN
PAINLEVEL_OUTOF10: 2
PAINLEVEL_OUTOF10: 0 - NO PAIN
PAINLEVEL_OUTOF10: 0 - NO PAIN
PAINLEVEL_OUTOF10: 1
PAINLEVEL_OUTOF10: 6

## 2025-01-30 ASSESSMENT — ACTIVITIES OF DAILY LIVING (ADL)
ADEQUATE_TO_COMPLETE_ADL: YES
HEARING - LEFT EAR: FUNCTIONAL
WALKS IN HOME: INDEPENDENT
GROOMING: INDEPENDENT
JUDGMENT_ADEQUATE_SAFELY_COMPLETE_DAILY_ACTIVITIES: YES
BATHING: INDEPENDENT
HEARING - RIGHT EAR: FUNCTIONAL
DRESSING YOURSELF: INDEPENDENT
TOILETING: INDEPENDENT
PATIENT'S MEMORY ADEQUATE TO SAFELY COMPLETE DAILY ACTIVITIES?: YES
FEEDING YOURSELF: INDEPENDENT
LACK_OF_TRANSPORTATION: NO

## 2025-01-30 ASSESSMENT — COGNITIVE AND FUNCTIONAL STATUS - GENERAL
DAILY ACTIVITIY SCORE: 24
MOBILITY SCORE: 21
WALKING IN HOSPITAL ROOM: A LITTLE
STANDING UP FROM CHAIR USING ARMS: A LITTLE
PATIENT BASELINE BEDBOUND: NO
CLIMB 3 TO 5 STEPS WITH RAILING: A LITTLE

## 2025-01-30 ASSESSMENT — PAIN DESCRIPTION - PAIN TYPE: TYPE: ACUTE PAIN

## 2025-01-30 ASSESSMENT — ENCOUNTER SYMPTOMS
FLANK PAIN: 1
ABDOMINAL PAIN: 1
FEVER: 1
FATIGUE: 1
CHILLS: 1

## 2025-01-30 ASSESSMENT — PATIENT HEALTH QUESTIONNAIRE - PHQ9
1. LITTLE INTEREST OR PLEASURE IN DOING THINGS: NOT AT ALL
2. FEELING DOWN, DEPRESSED OR HOPELESS: NOT AT ALL
SUM OF ALL RESPONSES TO PHQ9 QUESTIONS 1 & 2: 0

## 2025-01-30 ASSESSMENT — PAIN - FUNCTIONAL ASSESSMENT

## 2025-01-30 ASSESSMENT — LIFESTYLE VARIABLES
AUDIT-C TOTAL SCORE: 0
HOW OFTEN DO YOU HAVE 6 OR MORE DRINKS ON ONE OCCASION: NEVER
SKIP TO QUESTIONS 9-10: 1
HOW OFTEN DO YOU HAVE A DRINK CONTAINING ALCOHOL: NEVER
AUDIT-C TOTAL SCORE: 0
HOW MANY STANDARD DRINKS CONTAINING ALCOHOL DO YOU HAVE ON A TYPICAL DAY: PATIENT DOES NOT DRINK

## 2025-01-30 ASSESSMENT — PAIN DESCRIPTION - LOCATION
LOCATION: ABDOMEN
LOCATION: ABDOMEN

## 2025-01-30 NOTE — ANESTHESIA POSTPROCEDURE EVALUATION
Patient: Jakob Austin    Procedure Summary       Date: 01/30/25 Room / Location: [facility] OR 03 / Virtual [facility] OR    Anesthesia Start: 1107 Anesthesia Stop: 1217    Procedure: CYSTOSCOPY, WITH ATTEMPTED LEFT STENT PLACEMENT (Left) Diagnosis:       Ureteral stone      Sepsis, due to unspecified organism, unspecified whether acute organ dysfunction present (Multi)      Calculus of ureter      (Ureteral stone [N20.1])      (Sepsis, due to unspecified organism, unspecified whether acute organ dysfunction present (Multi) [A41.9])      (Calculus of ureter [N20.1])    Surgeons: Dejuan Barclay MD Responsible Provider: Citlalli Sullivan MD    Anesthesia Type: general ASA Status: 3            Anesthesia Type: general    Vitals Value Taken Time   /66 01/30/25 1215   Temp 37.5 01/30/25 1217   Pulse 105 01/30/25 1216   Resp 20 01/30/25 1217   SpO2 98 % 01/30/25 1216   Vitals shown include unfiled device data.    Anesthesia Post Evaluation    Patient location during evaluation: PACU  Patient participation: complete - patient participated  Level of consciousness: awake and alert  Pain score: 0  Pain management: adequate  Airway patency: patent  Cardiovascular status: acceptable  Respiratory status: acceptable and face mask  Hydration status: acceptable  Postoperative Nausea and Vomiting: none        There were no known notable events for this encounter.

## 2025-01-30 NOTE — CONSULTS
Vancomycin Dosing by Pharmacy- INITIAL    Jakob Austin is a 69 y.o. year old male who Pharmacy has been consulted for vancomycin dosing for other UTI . Based on the patient's indication and renal status this patient will be dosed based on a goal trough/random level of 10-15.     Renal function is currently declining.    Visit Vitals  /79   Pulse (!) 139   Temp (!) 39.7 °C (103.5 °F)   Resp (!) 31        Lab Results   Component Value Date    CREATININE 1.53 (H) 2025    CREATININE 0.94 10/08/2024    CREATININE 0.93 2024    CREATININE 1.12 2023        Patient weight is as follows:   Vitals:    25   Weight: 93.9 kg (207 lb)     Cultures:  No results found for the encounter in last 14 days.    Temp (24hrs), Av.3 °C (102.8 °F), Min:38.4 °C (101.1 °F), Max:40.2 °C (104.4 °F)     Assessment/Plan     Patient will be given a loading dose of 2000 mg.  Will initiate vancomycin dosing by levels due to TARYN  Follow-up level will be ordered on  at 0400 unless clinically indicated sooner.  Will continue to monitor renal function daily while on vancomycin and order serum creatinine at least every 48 hours if not already ordered.  Follow for continued vancomycin needs, clinical response, and signs/symptoms of toxicity.       Claudia Solis, [facility]

## 2025-01-30 NOTE — ED TRIAGE NOTES
Pt had inguinal hernia surgery on 1/2/25. Three days ago Pt developed ABD pain and fever, bodyaches.

## 2025-01-30 NOTE — ED TRIAGE NOTES
TRIAGE NOTE   I saw the patient as the Clinician in Triage and performed a brief history and physical exam, established acuity, and ordered appropriate tests to develop basic plan of care. Patient will be seen by an RUDI, resident and/or physician who will independently evaluate the patient. Please see subsequent provider notes for further details and disposition.     Brief HPI: In brief, Jakob Austin is a 69 y.o. male with significant PMH for CAD, BPH, HLD, GERD, HTN and diverticulosis who had a left inguinal hernia repair per Dr. Santos 1/2 presenting to ED today from home with family for evaluation of abdominal pain.  2 to 3 days ago the patient developed pain on the left side of the abdomen that has expanded to the entire belly, abdomen feels distended.  Currently rated 7/10.  Endorses shaking chills.  Coricidin taken at 4 PM provided no relief of symptoms. Nothing palliates or provokes the symptoms.  Denies sick contacts.  Denies cough/cold symptoms, chest pain, shortness of breath, nausea/vomiting, dysuria/hematuria, change in bowel habits or any other complaints.  No smoking, EtOH or IV drugs.  PCP is Dr. Miller.     Focused Physical exam:   General: 69-year-old male, awake and alert, oriented x 3.  Well-nourished, lips dry.  Appears extremely uncomfortable and weak/fatigued.  Skin: Pink, warm and dry.  Cardiac: Tachycardic at 136.  Pulmonary: Clear bilaterally.  Abdomen: Distended and tender in the left upper quadrant.  Bowel sounds x 4.  No rebound or guarding.  No palpable organomegaly.  No CVA tenderness.    Plan/MDM:     69 y.o. male with significant PMH for CAD, BPH, HLD, GERD, HTN and diverticulosis who had a left inguinal hernia repair per Dr. Santos 1/2/25 is evaluated at the bedside for left-sided abdominal pain that is worsened over the last 2 to 3 days.  On arrival to the ED, blood pressure 168/77, tachycardic at 136.  Patient is not hypoxic but is febrile at 104.4.  Meets sepsis criteria.  IV  established, will check basic labs including lactate/cultures, EKG, UA/urine culture and CT abdomen/pelvis with contrast will be performed in preparation for further evaluation in the main ED.  Patient is agreeable to this plan.    Please see subsequent provider note for further details and disposition

## 2025-01-30 NOTE — ANESTHESIA PROCEDURE NOTES
Airway  Date/Time: 1/30/2025 11:12 AM  Urgency: elective    Airway not difficult    Staffing  Performed: CRNA   Authorized by: Citlalli Sullivan MD    Performed by: NEVAEH Matthews-CRNA  Patient location during procedure: OR    Indications and Patient Condition  Indications for airway management: anesthesia  Spontaneous Ventilation: absent  Sedation level: deep  Preoxygenated: yes  Patient position: sniffing  MILS maintained throughout  Mask difficulty assessment: 0 - not attempted  Planned trial extubation    Final Airway Details  Final airway type: supraglottic airway      Successful airway: Supraglottic airway: i-Gel.  Size 5     Number of attempts at approach: 1  Ventilation between attempts: none  Number of other approaches attempted: 0

## 2025-01-30 NOTE — PROGRESS NOTES
Pharmacy Medication History Review    Jakob Austin is a 69 y.o. male admitted for Ureteral stone. Pharmacy reviewed the patient's [id number] medications and allergies for accuracy.    The list below reflectives the updated PTA list. Please review each medication in order reconciliation for additional clarification and justification.  Prior to Admission medications    Medication Sig Start Date End Date Authorizing Provider   ascorbic acid (Vitamin C) 500 mg ER capsule Take 1 capsule (500 mg) by mouth once daily.   Historical Provider, MD   aspirin 81 mg EC tablet Take 1 tablet (81 mg) by mouth once daily.   Historical Provider, MD   atorvastatin (Lipitor) 40 mg tablet Take 1 tablet (40 mg) by mouth once daily.   Historical Provider, MD   flaxseed oiL 1,000 mg capsule Take 1 capsule (1,000 mg) by mouth once daily.   Historical Provider, MD   lisinopril 5 mg tablet TAKE 1 TABLET BY MOUTH DAILY   Xavi Velarde,    metoprolol succinate XL (Toprol-XL) 25 mg 24 hr tablet Take 1 tablet (25 mg) by mouth once daily. Do not crush or chew.   Marino Scott MD   multivitamin with minerals tablet Take 1 tablet by mouth once daily.   Historical Provider, MD   sildenafil (Viagra) 100 mg tablet TAKE 1 TABLET BY MOUTH 1 HOUR BEFORE NEEDED.   Aaron Sustersic V, DO   turmeric root extract 500 mg capsule Take 1 capsule by mouth once daily.   Historical Provider, MD        The list below reflectives the updated allergy list. Please review each documented allergy for additional clarification and justification.  Allergies  Reviewed by Teodoro Garcia RN on 1/29/2025   No Known Allergies         Below are additional concerns with the patient's PTA list.      Lizet Garcia

## 2025-01-30 NOTE — ED PROVIDER NOTES
HPI   Chief Complaint   Patient presents with    Abdominal Pain    Fever     ABD pain, fever, bodyaches, chills.       HPI  Here with left-sided abdominal and flank pain  History of hernia repair on 1/2/2025.  States postoperative course was normal.  Last saw him for follow-up on 1/20/2025 and things were going well.  3 days ago began having left-sided abdominal pain in the flank area  Associated with fevers and chills  Today pain became lower in the abdomen on the left side        Patient History   History reviewed. No pertinent past medical history.  History reviewed. No pertinent surgical history.  Family History   Problem Relation Name Age of Onset    Hypertension Mother      Hyperlipidemia Mother       Social History     Tobacco Use    Smoking status: Never    Smokeless tobacco: Never   Substance Use Topics    Alcohol use: Never    Drug use: Never     Review of Systems   Constitutional:  Positive for chills and fever.   Gastrointestinal:  Positive for abdominal pain.       Physical Exam   ED Triage Vitals   Temperature Heart Rate Respirations BP   01/29/25 1959 01/29/25 1959 01/29/25 1959 01/29/25 1959   (!) 40.2 °C (104.4 °F) (!) 136 20 168/77      Pulse Ox Temp Source Heart Rate Source Patient Position   01/29/25 1959 01/29/25 1959 01/29/25 2146 01/29/25 2100   95 % Temporal Monitor Lying      BP Location FiO2 (%)     01/29/25 1959 --     Right arm        Physical Exam  Vitals and nursing note reviewed.   Constitutional:       Appearance: Normal appearance.   Cardiovascular:      Rate and Rhythm: Normal rate.   Pulmonary:      Effort: Pulmonary effort is normal. No respiratory distress.   Abdominal:      Tenderness: There is abdominal tenderness. There is left CVA tenderness.   Neurological:      Mental Status: He is alert.   Psychiatric:         Behavior: Behavior normal.         Thought Content: Thought content normal.           ED Course & MDM   Diagnoses as of 01/30/25 0807   Ureteral stone    Hydronephrosis, unspecified hydronephrosis type   Urinary tract infection with hematuria, site unspecified   Sepsis, due to unspecified organism, unspecified whether acute organ dysfunction present (Multi)     Is awake alert and oriented.  Febrile and tachycardic on arrival.  Patient was seen initially in triage by RUDI and workup was initiated with labs, urinalysis and blood cultures.  Lactic acid is normal.  Urinalysis concerning for infection as he has 21-50 WBCs with 1+ bacteria.  75 leuk esterase but is nitrite negative.  Covered with Zosyn in order to cover for possible intra-abdominal infection in the setting of surgery within the past month.  Labs are notable for leukocytosis to 14.  Creatinine of 1.53.  CT imaging was also obtained.  CT demonstrates left-sided obstructive stone with stranding of the left kidney.  Given this in the setting of the patient's tachycardia, fever and evidence of urinary tract infection on urinalysis there is concern for infected stone.  Patient did receive antibiotics.  His pressures remained stable without any episodes of hypotension and lactate is not elevated, therefore he did not receive received 30 cc/kg bolus.  He did receive pain and nausea medication here in the emergency department.  Case discussed with urology who recommends admission to medicine.  They will see the patient first thing in the morning..             No data recorded     Marie Coma Scale Score: 15 (01/29/25 2007 : Teodoro Garcia RN)                           Medical Decision Making      Procedure  Procedures     Marilin Oliveira MD  01/30/25 1691

## 2025-01-30 NOTE — ANESTHESIA PREPROCEDURE EVALUATION
Patient: Jakob Austin    Procedure Information       Date/Time: 01/30/25 1100    Procedure: CYSTOSCOPY, WITH LEFT STENT PLACEMENT (Left) - obstructinf left ureteral stone with sepsis    Location: [facility] / [facility]    Surgeons: Dejuan Barclay MD            Relevant Problems   Anesthesia (within normal limits)      Cardiac   (+) CAD (coronary artery disease)   (+) Exertional chest pain   (+) Hypertension   (+) Presence of stent in left circumflex coronary artery      Pulmonary   (+) Dyspnea on exertion      GI   (+) Gastroesophageal reflux disease      /Renal   (+) Benign prostatic hyperplasia      Endocrine   (+) Obesity       Clinical information reviewed:   Tobacco  Allergies  Meds   Med Hx  Surg Hx   Fam Hx  Soc Hx        NPO Detail:  NPO/Void Status  Carbohydrate Drink Given Prior to Surgery? : N  Date of Last Liquid: 01/30/25  Time of Last Liquid: 0308 (MEDICATIONS AND 0825 KCL)  Date of Last Solid: 01/29/25  Time of Last Solid: 1100  Last Intake Type: Light meal  Time of Last Void: 1023 (75 CC VOIDED URINAL CONCENTRATED)         Physical Exam    Airway  Mallampati: II  TM distance: >3 FB  Neck ROM: full     Cardiovascular - normal exam     Dental - normal exam     Pulmonary - normal exam     Abdominal   (+) obese             Anesthesia Plan    History of general anesthesia?: yes  History of complications of general anesthesia?: no    ASA 3     general     The patient is not a current smoker.    intravenous induction   Postoperative administration of opioids is intended.  Trial extubation is planned.  Anesthetic plan and risks discussed with patient.    Plan discussed with CRNA and CAA.

## 2025-01-30 NOTE — PRE-PROCEDURE NOTE
Interventional Radiology Preprocedure Note    Indication for procedure: The primary encounter diagnosis was Ureteral stone. Diagnoses of Hydronephrosis, unspecified hydronephrosis type, Urinary tract infection with hematuria, site unspecified, Sepsis, due to unspecified organism, unspecified whether acute organ dysfunction present (Multi), and Calculus of ureter were also pertinent to this visit.    Relevant review of systems: NA    Relevant Labs:   Lab Results   Component Value Date    CREATININE 1.42 (H) 01/30/2025    EGFR 53 (L) 01/30/2025    INR 1.4 (H) 01/30/2025    PROTIME 16.3 (H) 01/30/2025       Planned Sedation/Anesthesia: Moderate    Airway assessment: normal    Directed physical examination:    RRR, lungs CTA-B    Mallampati: III (soft and hard palate and base of uvula visible)    ASA Score: ASA 3 - Patient with moderate systemic disease with functional limitations    Benefits, risks and alternatives of procedure and planned sedation have been discussed with the patient and/or their representative. All questions answered and they agree to proceed.

## 2025-01-30 NOTE — OP NOTE
CYSTOSCOPY, WITH ATTEMPTED LEFT STENT PLACEMENT (L) Operative Note     Date: 2025 - 2025  OR Location: [facility]    Name: Jakob Austin, : 1955, Age: 69 y.o., MRN: [id number], Sex: male    Diagnosis  Pre-op Diagnosis      * Ureteral stone [N20.1]     * Sepsis, due to unspecified organism, unspecified whether acute organ dysfunction present (Multi) [A41.9]     * Calculus of ureter [N20.1] Post-op Diagnosis     * Ureteral stone [N20.1]     * Sepsis, due to unspecified organism, unspecified whether acute organ dysfunction present (Multi) [A41.9]     * Calculus of ureter [N20.1]     Procedures  CYSTOSCOPY, WITH ATTEMPTED LEFT STENT PLACEMENT  89403 - NM CYSTOURETHROSCOPY W/URETERAL CATHETERIZATION      Surgeons      * Dejuan Barclay - Primary    Resident/Fellow/Other Assistant:  Surgeons and Role:  * No surgeons found with a matching role *    Staff:   Circulator: Cheo Huertas Person: Iman    Anesthesia Staff: Anesthesiologist: Citlalli Sullivan MD  CRNA: NEVAEH Matthews-CRNA    Procedure Summary  Anesthesia: General  ASA: III  Estimated Blood Loss: 0mL  Intra-op Medications: * Intraprocedure medication information is unavailable because the case start and end events have not been set *           Anesthesia Record               Intraprocedure I/O Totals          Intake    LR bolus 1000.00 mL    Total Intake 1000 mL          Specimen: No specimens collected              Drains and/or Catheters: * None in log *    Tourniquet Times:         Implants:     Findings: Distal left ureteral stone with obstruction.    Indications: Jakob Austin is an 69 y.o. male who is having surgery for Ureteral stone [N20.1]  Sepsis, due to unspecified organism, unspecified whether acute organ dysfunction present (Multi) [A41.9]  Calculus of ureter [N20.1].    The patient was seen in the preoperative area. The risks, benefits, complications, treatment options, non-operative alternatives, expected recovery and outcomes were  discussed with the patient. The possibilities of reaction to medication, pulmonary aspiration, injury to surrounding structures, bleeding, recurrent infection, the need for additional procedures, failure to diagnose a condition, and creating a complication requiring transfusion or operation were discussed with the patient. The patient concurred with the proposed plan, giving informed consent.  The site of surgery was properly noted/marked if necessary per policy. The patient has been actively warmed in preoperative area. Preoperative antibiotics  Venous thrombosis prophylaxis     Procedure Details: This is 69-year-old male who is admitted with a 7 mm distal ureteral stone with hydronephrosis obstruction and sepsis.  Patient received IV antibiotics rapidly and after in discussion of treatment options risk benefits possible occasions alternatives all questions answered patient wishes to proceed.  Patient was placed in the dorsolithotomy position prepped and draped in usual sterile fashion #22 [place] panendoscope used for cystoscopy with a 30 and 7 agree lens there was a enlarged prostate with extremely large median lobe ureteral orifice ease the right side was seen in the left side was at an abnormal angle no bladder tumors or stones were seen multiple attempts at placement of a Glidewire on the left side were unsuccessful..  Used multiple wires I used open-ended catheters I used whistle-tip catheters a spiral catheters we did flexible cystoscopy did the semirigid ureteroscope and all attempts were unsuccessful for placing a guidewire up the left ureter.  Plan patient will need a left percutaneous nephrostomy tube placed by interventional radiology  Complications:     Disposition: PACU - hemodynamically stable.  Condition: stable                 Additional Details:     Attending Attestation: I performed the procedure.    Dejuan Barclay  Phone Number: [phone]

## 2025-01-30 NOTE — CONSULTS
"Consults    Reason For Consult  Ureteral stone and sepsis    History Of Present Illness  Jakob Austin is a 69 y.o. male presenting with 3 day h/o left sided abdominal, flank pain, fever and chills. Pain and chills worsened yesterday prompting er visit.  Presented with fever of 40.2, uti and obstructing distal left 7 mm ureteral stone  Bp is stable. He is tachycardic  Wbc 14.1, lactate 1.2 (I dont see a repeat)  Urine is turbid   He is having chills and llq to flank pain.  Meropenem started     Past Medical History  He has no past medical history on file.    Surgical History  He has no past surgical history on file.     Social History  He reports that he has never smoked. He has never used smokeless tobacco. He reports that he does not drink alcohol and does not use drugs.    Family History  Family History   Problem Relation Name Age of Onset    Hypertension Mother      Hyperlipidemia Mother          Allergies  Patient has no known allergies.    Review of Systems     Physical Exam  Lying on a gurney and in distress with rigors  A&o x3       Last Recorded Vitals  Blood pressure 122/57, pulse 91, temperature 36.2 °C (97.2 °F), temperature source Temporal, resp. rate 20, height 1.753 m (5' 9\"), weight 93.9 kg (207 lb), SpO2 96%.    Relevant Results  Scheduled medications  [Held by provider] aspirin, 81 mg, oral, Daily  [START ON 1/31/2025] atorvastatin, 40 mg, oral, Daily  [START ON 1/31/2025] heparin (porcine), 5,000 Units, subcutaneous, q8h  meropenem, 1 g, intravenous, q8h      Continuous medications  lactated Ringer's, 125 mL/hr, Last Rate: 125 mL/hr (01/30/25 0628)      PRN medications  PRN medications: acetaminophen, morphine, ondansetron, vancomycin   CT abdomen pelvis w IV contrast    Result Date: 1/29/2025  Interpreted By:  Mil Beltre, STUDY: CT ABDOMEN PELVIS W IV CONTRAST;  1/29/2025 10:16 pm   INDICATION: Signs/Symptoms:Abdominal pain, fever, tachycardia.   COMPARISON: CT scan of the abdomen pelvis " 06/11/2020.   ACCESSION NUMBER(S): [id number]   ORDERING CLINICIAN: EVON HU   TECHNIQUE: Axial CT images of the abdomen and pelvis with coronal and sagittal reconstructed images obtained after intravenous administration of 75 mL of Omnipaque 350   FINDINGS: LOWER CHEST: Emphysematous changes noted in the lung bases. Bibasilar atelectasis and or scarring. Coronary artery calcifications noted. Moderate-size hiatal hernia.   ABDOMEN:   LIVER: Normal morphology. Liver is hypodense relative to the spleen which can be seen in the setting of steatosis. BILE DUCTS: Normal caliber. GALLBLADDER: No calcified gallstones. No wall thickening. PANCREAS: Within normal limits. SPLEEN: Splenomegaly measuring 16 cm in craniocaudal dimension. ADRENALS: Nodular thickening of the adrenal glands may relate to hyperplasia or adenomatous change. KIDNEYS and URETERS: There is delayed contrast enhancement of the left kidney relative to the right. There is left-sided hydronephrosis and hydroureter which extends to the level of the distal ureter where an obstructing 7 mm calculus is noted just proximal to the left UVJ. There is left perinephric inflammatory stranding with peripheral wedge-shaped areas of hypoattenuation which raises concern for developing pyelonephritis. Right kidney is normal size without evidence for calculi, hydronephrosis, hydroureter or perinephric inflammatory changes. Subcentimeter hypodense foci bilaterally are too small to characterize.   VESSELS:  Calcific atherosclerosis of the aortoiliac vessels. No aortic aneurysm. RETROPERITONEUM: No pathologically enlarged retroperitoneal lymph nodes.   PELVIS:   REPRODUCTIVE ORGANS: Central prostatic calcifications noted. Prostate gland protrudes into the base of the bladder. BLADDER: Bladder is underdistended with mild wall thickening.   BOWEL: Moderate-size hiatal hernia. Stomach is underdistended. Visualized loops of bowel are without evidence for obstruction. Scattered  colonic diverticula without evidence for acute diverticulitis. No pneumatosis or portal venous gas.  Normal appendix. PERITONEUM: No ascites or free air, no fluid collection.   ABDOMINAL WALL: Patulous inguinal canals containing fat. Small umbilical hernia contains fat. BONES: Multilevel degenerative changes of the spine.       Left-sided hydronephrosis and hydroureter extending to the distal ureter where an obstructing 7 mm calculus is identified just proximal to the UVJ. There is delayed contrast enhancement of the left kidney with perinephric inflammatory stranding. There subtle peripheral wedge-shaped areas of hypoattenuation in the left kidney which raises concern for developing pyelonephritis. Correlate with urinalysis.   Diffuse hepatic steatosis.   Stable splenomegaly.   Diverticulosis without evidence for acute diverticulitis.   Additional findings as described above.     MACRO: None   Signed by: Mil Beltre 1/29/2025 11:11 PM Dictation workstation:   [id number]    Results for orders placed or performed during the hospital encounter of 01/29/25 (from the past 24 hours)   Comprehensive metabolic panel   Result Value Ref Range    Glucose 140 (H) 74 - 99 mg/dL    Sodium 136 136 - 145 mmol/L    Potassium 4.2 3.5 - 5.3 mmol/L    Chloride 100 98 - 107 mmol/L    Bicarbonate 23 21 - 32 mmol/L    Anion Gap 17 10 - 20 mmol/L    Urea Nitrogen 24 (H) 6 - 23 mg/dL    Creatinine 1.53 (H) 0.50 - 1.30 mg/dL    eGFR 48 (L) >60 mL/min/1.73m*2    Calcium 9.0 8.6 - 10.3 mg/dL    Albumin 4.2 3.4 - 5.0 g/dL    Alkaline Phosphatase 53 33 - 136 U/L    Total Protein 7.6 6.4 - 8.2 g/dL    AST 32 9 - 39 U/L    Bilirubin, Total 1.0 0.0 - 1.2 mg/dL    ALT 24 10 - 52 U/L   Lipase   Result Value Ref Range    Lipase 13 9 - 82 U/L   Lactate   Result Value Ref Range    Lactate 1.2 0.4 - 2.0 mmol/L   Sars-CoV-2 and Influenza A/B PCR   Result Value Ref Range    Flu A Result Not Detected Not Detected    Flu B Result Not Detected Not  Detected    Coronavirus 2019, PCR Not Detected Not Detected   Urinalysis with Reflex Culture and Microscopic   Result Value Ref Range    Color, Urine Yellow Light-Yellow, Yellow, Dark-Yellow    Appearance, Urine Turbid (N) Clear    Specific Gravity, Urine 1.023 1.005 - 1.035    pH, Urine 5.5 5.0, 5.5, 6.0, 6.5, 7.0, 7.5, 8.0    Protein, Urine 70 (1+) (A) NEGATIVE, 10 (TRACE), 20 (TRACE) mg/dL    Glucose, Urine Normal Normal mg/dL    Blood, Urine 0.1 (1+) (A) NEGATIVE    Ketones, Urine NEGATIVE NEGATIVE mg/dL    Bilirubin, Urine NEGATIVE NEGATIVE    Urobilinogen, Urine Normal Normal mg/dL    Nitrite, Urine NEGATIVE NEGATIVE    Leukocyte Esterase, Urine 75 Fernando/uL (A) NEGATIVE   Extra Urine Gray Tube   Result Value Ref Range    Extra Tube Hold for add-ons.    Microscopic Only, Urine   Result Value Ref Range    WBC, Urine 21-50 (A) 1-5, NONE /HPF    WBC Clumps, Urine RARE Reference range not established. /HPF    RBC, Urine 3-5 NONE, 1-2, 3-5 /HPF    Squamous Epithelial Cells, Urine 1-9 (SPARSE) Reference range not established. /HPF    Bacteria, Urine 1+ (A) NONE SEEN /HPF    Mucus, Urine FEW Reference range not established. /LPF    Uric Acid Crystals, Urine 1+ NONE, 1+ /HPF    Amorphous Crystals, Urine 1+ NONE, 1+, 2+ /HPF   CBC and Auto Differential   Result Value Ref Range    WBC 14.1 (H) 4.4 - 11.3 x10*3/uL    nRBC 0.0 0.0 - 0.0 /100 WBCs    RBC 3.98 (L) 4.50 - 5.90 x10*6/uL    Hemoglobin 11.9 (L) 13.5 - 17.5 g/dL    Hematocrit 35.0 (L) 41.0 - 52.0 %    MCV 88 80 - 100 fL    MCH 29.9 26.0 - 34.0 pg    MCHC 34.0 32.0 - 36.0 g/dL    RDW 14.0 11.5 - 14.5 %    Platelets 145 (L) 150 - 450 x10*3/uL    Neutrophils % 89.7 40.0 - 80.0 %    Immature Granulocytes %, Automated 1.1 (H) 0.0 - 0.9 %    Lymphocytes % 2.2 13.0 - 44.0 %    Monocytes % 6.8 2.0 - 10.0 %    Eosinophils % 0.0 0.0 - 6.0 %    Basophils % 0.2 0.0 - 2.0 %    Neutrophils Absolute 12.60 (H) 1.60 - 5.50 x10*3/uL    Immature Granulocytes Absolute, Automated 0.16  0.00 - 0.50 x10*3/uL    Lymphocytes Absolute 0.31 (L) 0.80 - 3.00 x10*3/uL    Monocytes Absolute 0.96 (H) 0.05 - 0.80 x10*3/uL    Eosinophils Absolute 0.00 0.00 - 0.40 x10*3/uL    Basophils Absolute 0.03 0.00 - 0.10 x10*3/uL   CBC and Auto Differential   Result Value Ref Range    WBC 10.4 4.4 - 11.3 x10*3/uL    nRBC 0.0 0.0 - 0.0 /100 WBCs    RBC 3.73 (L) 4.50 - 5.90 x10*6/uL    Hemoglobin 11.3 (L) 13.5 - 17.5 g/dL    Hematocrit 33.2 (L) 41.0 - 52.0 %    MCV 89 80 - 100 fL    MCH 30.3 26.0 - 34.0 pg    MCHC 34.0 32.0 - 36.0 g/dL    RDW 14.2 11.5 - 14.5 %    Platelets 144 (L) 150 - 450 x10*3/uL    Neutrophils % 89.0 40.0 - 80.0 %    Immature Granulocytes %, Automated 0.7 0.0 - 0.9 %    Lymphocytes % 3.0 13.0 - 44.0 %    Monocytes % 7.1 2.0 - 10.0 %    Eosinophils % 0.0 0.0 - 6.0 %    Basophils % 0.2 0.0 - 2.0 %    Neutrophils Absolute 9.21 (H) 1.60 - 5.50 x10*3/uL    Immature Granulocytes Absolute, Automated 0.07 0.00 - 0.50 x10*3/uL    Lymphocytes Absolute 0.31 (L) 0.80 - 3.00 x10*3/uL    Monocytes Absolute 0.74 0.05 - 0.80 x10*3/uL    Eosinophils Absolute 0.00 0.00 - 0.40 x10*3/uL    Basophils Absolute 0.02 0.00 - 0.10 x10*3/uL   Basic Metabolic Panel   Result Value Ref Range    Glucose 189 (H) 74 - 99 mg/dL    Sodium 137 136 - 145 mmol/L    Potassium 3.4 (L) 3.5 - 5.3 mmol/L    Chloride 103 98 - 107 mmol/L    Bicarbonate 26 21 - 32 mmol/L    Anion Gap 11 10 - 20 mmol/L    Urea Nitrogen 21 6 - 23 mg/dL    Creatinine 1.42 (H) 0.50 - 1.30 mg/dL    eGFR 53 (L) >60 mL/min/1.73m*2    Calcium 8.2 (L) 8.6 - 10.3 mg/dL   Magnesium   Result Value Ref Range    Magnesium 1.69 1.60 - 2.40 mg/dL          reviewed     Assessment/Plan     Obstructing left 7mm distal ureteral stone with uti, fevr and question of left pyelonephritis on ct  Bp stable for now   This may worsen.  Discussed the need for emergent left stent placement asap with him  He agrees  Also discussed that definitive stone treatment will be undertaken after full  course of culture appropriate antibiotics  Placed on or schedule today for cysto left jj stent placement    I spent 20 minutes in the professional and overall care of this patient.

## 2025-01-30 NOTE — PROGRESS NOTES
"                                                                 Hospital Medicine Progress Note       Patient Name: Jakob Austin   YOB: 1955    Subjective:    Jakob Austin is a 69 y.o.yo male who is hospital day 0     Patient seen and examined at bedside. He is ill-appearing, distressed,  and in some discomfort. He reports chills, fatigue, malaise, and lower left sided rib/abdominal pain. He denies, headache, shortness of breath, chest pain, or dysuria. He has been voiding appropriately without hematuria. Patient is currently on 2.5 L NC.      Objective:    Recent labs, imaging, vital signs and notes from other providers were reviewed     /57   Pulse 91   Temp 36.2 °C (97.2 °F) (Temporal)   Resp 20   Ht 1.753 m (5' 9\")   Wt 93.9 kg (207 lb)   SpO2 96%   BMI 30.57 kg/m²     Physical Exam:    GENERAL: well developed, toxic appearing, alert & cooperative, moderately distressed, rigors noted on exam   HEENT: normocephalic, atraumatic, sclera clear  CARDIAC: regular rate & rhythm, no murmurs, S1/S2  PULMONARY: CTA b/l, no respiratory distress, - wheezes  ABDOMEN: soft, non-tender, non-distended, LUQ tenderness to palpation, LLQ tenderness to palpation, left flank pain and CVA tenderness  MSK: no peripheral edema, no obvious deformity  NEURO: A&O X 3, non-focal, CN II-XII grossly intact  SKIN: Warm and dry, no lesions, no rashes.  PSYCH: appropriate mood & affect     Assessment/Plan:    Acute conditions:   # Sepsis in the setting of left calculus of ureter/obstructing 7mm stone complicated by pyelonephritis and hydronephrosis.  - Blood and urine cultures pending  - Patient started on empiric antibiotics - IV meropenem and vancomycin (per pharmacy renal dosing)   - Holding patient's home BP medications   - Continue with continuous IV LR infusion   - Pain management with PRN morphine, tylenol, and ondansetron   - Patient is to remain NPO expect sips with meds   - Per urology note, going to " proceed with a emergent left jj stent placement. Definitive stone treatment will be undertaken after a full course of culture appropriate antibiotics.   - Upon rounding the patient became tachycardic, LR 1,000 mL bolus was administered at 0915 for one dose.     # TARYN   - eGFR 53 and Cr 1.42, improving from yesterday, will continue to monitor with renal panel and post stent placement.  #Hypokalemia   - patient received potassium chloride 40 mEq PO once.     Chronic conditions:   # PVC's - continue to monitor if patient become symptomatic   # HTN and CAD - holding lisinopril, metoprolol, aspirin as he is at risk for decompensation and hypotension due to sepsis. He is established with Dr. Scott for outpatient cardiology.   # HLD - continue atorvastatin   #BPH   #GERD     DVT Prophylaxis: Heparin subcutaneous 5,000 units   Code Status: Full code   Disposition: Intermediate      JOSELITO CHEUNG  [facility]

## 2025-01-30 NOTE — H&P
History Of Present Illness  70 yo M with PMHx of CAD s/p PCI, HTN, HLD, PVCs, BPH, GERD, and obesity presents with progressively worsening L sided flank pain with radiation to his groin accompanied with fever, chills, rigors, and worsening generalized weakness over the past 3 days. He denies CP, sob, cough, sick contacts, dysuria, and hematuria. In the ED he was found to have an obstructing 7mm L sided calculus with associated hydronephrosis/hydroureter along with probable pyelonephritis. Urology was contacted from the ED and will be seeing him first thing in the morning.     Surgical History  L inguinal hernia repair, LHC with PCI    Social History  Former smoker, denies EtOH, and illicit    Family History  HTN, HLD     Allergies  Patient has no known allergies.    Review of Systems   Constitutional:  Positive for chills, fatigue and fever.   Genitourinary:  Positive for flank pain.   All other systems reviewed and are negative.    Physical Exam  G: aox3, NAD, cooperative  HENT: neck supple, no JVD, MM dry  Eyes: clear sclera  CV: tachy, reg rhythm, s1 s2  L: clear  Abd: soft, NT, non distended, obese  : L flank pain, L CVA tenderness  Ext: no c/c/e  N: no appreciable acute focal deficits  Psych: appropriate mood and behavior    Last Recorded Vitals  /79   Pulse (!) 139   Temp (!) 39.7 °C (103.5 °F)   Resp (!) 31   Wt 93.9 kg (207 lb)   SpO2 96%     Assessment/Plan     Sepsis in the setting of L pyelonephritis/obstructing stone  Obstructing 7mm L sided calculus with associated hydronephrosis/hydroureter  TARYN  Sinus tachycardia (in the setting of sepsis)    CAD s/p PCI  HTN, HLD  PVCs  BPH  GERD  DVT ppx  Full code    Plan:  - Blood and urine cultures collected, in the setting of sepsis/obstruction start empiric broad spectrum IV abx and follow up cultures, continue IVF, NPO except meds, urology consulted from the ED and will be seeing patient first thing this AM, he will need to go to the OR for  urologic intervention  - Hold all home BP meds as he is high risk for decompensation and dropping his BPs    [place], DO

## 2025-01-30 NOTE — POST-PROCEDURE NOTE
Interventional Radiology Brief Postprocedure Note    Attending: Yung Vasquez MD    Assistant:   Staff Role   Samina Bell MT Radiology Technologist   Yung Vasquez MD Radiologist   Luz Maria Abraham MT Radiology Technologist   Jeff Sherwood, JESSICA Radiology Nurse       Diagnosis:   1. Ureteral stone  Case Request Operating Room: CYSTOSCOPY, WITH URETERAL CATHETER INSERTION    Case Request Operating Room: CYSTOSCOPY, WITH URETERAL CATHETER INSERTION      2. Hydronephrosis, unspecified hydronephrosis type        3. Urinary tract infection with hematuria, site unspecified        4. Sepsis, due to unspecified organism, unspecified whether acute organ dysfunction present (Multi)  Case Request Operating Room: CYSTOSCOPY, WITH URETERAL CATHETER INSERTION    Case Request Operating Room: CYSTOSCOPY, WITH URETERAL CATHETER INSERTION      5. Calculus of ureter  Case Request Operating Room: CYSTOSCOPY, WITH URETERAL CATHETER INSERTION    Case Request Operating Room: CYSTOSCOPY, WITH URETERAL CATHETER INSERTION          Description of procedure: image guided placement of a 10F nephrostomy tube into left kidney. Obstructing stone in left distal ureter noted.     Timeout:  Yes    Procedure Area: Procedure Area     Anesthesia:   Local/Topical    Complications: None    Estimated Blood Loss: minimal    Medications (Filter: Administrations occurring from 1527 to 1527 on 01/30/25) As of 01/30/25 1527      None          No specimens collected      See detailed result report with images in PACS.    The patient tolerated the procedure well without incident or complication and is in stable condition.

## 2025-01-30 NOTE — NURSING NOTE
Pt arrived to 8th floor from PACU, vitals obtained, pt oriented to room, call light within reach.

## 2025-01-30 NOTE — CARE PLAN
The patient's goals for the shift include      The clinical goals for the shift include Admit to 826      Problem: Pain - Adult  Goal: Verbalizes/displays adequate comfort level or baseline comfort level  Outcome: Progressing     Problem: Safety - Adult  Goal: Free from fall injury  Outcome: Progressing     Problem: Discharge Planning  Goal: Discharge to home or other facility with appropriate resources  Outcome: Progressing     Problem: Chronic Conditions and Co-morbidities  Goal: Patient's chronic conditions and co-morbidity symptoms are monitored and maintained or improved  Outcome: Progressing     Problem: Nutrition  Goal: Nutrient intake appropriate for maintaining nutritional needs  Outcome: Progressing     Problem: Pain  Goal: Turns in bed with improved pain control throughout the shift  Outcome: Progressing  Goal: Free from opioid side effects throughout the shift  Outcome: Progressing  Goal: Free from acute confusion related to pain meds throughout the shift  Outcome: Progressing

## 2025-01-31 ENCOUNTER — APPOINTMENT (OUTPATIENT)
Dept: RADIOLOGY | Facility: HOSPITAL | Age: 70
DRG: 872 | End: 2025-01-31
Payer: MEDICARE

## 2025-01-31 LAB
ANION GAP SERPL CALC-SCNC: 10 MMOL/L (ref 10–20)
BUN SERPL-MCNC: 28 MG/DL (ref 6–23)
CALCIUM SERPL-MCNC: 8.5 MG/DL (ref 8.6–10.3)
CHLORIDE SERPL-SCNC: 106 MMOL/L (ref 98–107)
CO2 SERPL-SCNC: 26 MMOL/L (ref 21–32)
CREAT SERPL-MCNC: 1.28 MG/DL (ref 0.5–1.3)
EGFRCR SERPLBLD CKD-EPI 2021: 60 ML/MIN/1.73M*2
ERYTHROCYTE [DISTWIDTH] IN BLOOD BY AUTOMATED COUNT: 13.9 % (ref 11.5–14.5)
GLUCOSE SERPL-MCNC: 139 MG/DL (ref 74–99)
HCT VFR BLD AUTO: 32.9 % (ref 41–52)
HGB BLD-MCNC: 10.8 G/DL (ref 13.5–17.5)
MAGNESIUM SERPL-MCNC: 1.92 MG/DL (ref 1.6–2.4)
MCH RBC QN AUTO: 29.5 PG (ref 26–34)
MCHC RBC AUTO-ENTMCNC: 32.8 G/DL (ref 32–36)
MCV RBC AUTO: 90 FL (ref 80–100)
NRBC BLD-RTO: 0 /100 WBCS (ref 0–0)
PLATELET # BLD AUTO: 141 X10*3/UL (ref 150–450)
POTASSIUM SERPL-SCNC: 4.3 MMOL/L (ref 3.5–5.3)
RBC # BLD AUTO: 3.66 X10*6/UL (ref 4.5–5.9)
SODIUM SERPL-SCNC: 138 MMOL/L (ref 136–145)
VANCOMYCIN TROUGH SERPL-MCNC: 5.8 UG/ML (ref 5–20)
WBC # BLD AUTO: 9.8 X10*3/UL (ref 4.4–11.3)

## 2025-01-31 PROCEDURE — 2060000001 HC INTERMEDIATE ICU ROOM DAILY

## 2025-01-31 PROCEDURE — 99232 SBSQ HOSP IP/OBS MODERATE 35: CPT

## 2025-01-31 PROCEDURE — 2500000004 HC RX 250 GENERAL PHARMACY W/ HCPCS (ALT 636 FOR OP/ED): Performed by: STUDENT IN AN ORGANIZED HEALTH CARE EDUCATION/TRAINING PROGRAM

## 2025-01-31 PROCEDURE — 80202 ASSAY OF VANCOMYCIN: CPT | Performed by: UROLOGY

## 2025-01-31 PROCEDURE — 2500000001 HC RX 250 WO HCPCS SELF ADMINISTERED DRUGS (ALT 637 FOR MEDICARE OP): Performed by: STUDENT IN AN ORGANIZED HEALTH CARE EDUCATION/TRAINING PROGRAM

## 2025-01-31 PROCEDURE — 36415 COLL VENOUS BLD VENIPUNCTURE: CPT

## 2025-01-31 PROCEDURE — 2500000001 HC RX 250 WO HCPCS SELF ADMINISTERED DRUGS (ALT 637 FOR MEDICARE OP)

## 2025-01-31 PROCEDURE — 83735 ASSAY OF MAGNESIUM: CPT

## 2025-01-31 PROCEDURE — 2500000004 HC RX 250 GENERAL PHARMACY W/ HCPCS (ALT 636 FOR OP/ED): Performed by: UROLOGY

## 2025-01-31 PROCEDURE — 85027 COMPLETE CBC AUTOMATED: CPT

## 2025-01-31 PROCEDURE — 2500000001 HC RX 250 WO HCPCS SELF ADMINISTERED DRUGS (ALT 637 FOR MEDICARE OP): Performed by: UROLOGY

## 2025-01-31 PROCEDURE — 74018 RADEX ABDOMEN 1 VIEW: CPT | Performed by: STUDENT IN AN ORGANIZED HEALTH CARE EDUCATION/TRAINING PROGRAM

## 2025-01-31 PROCEDURE — 74018 RADEX ABDOMEN 1 VIEW: CPT

## 2025-01-31 PROCEDURE — 80048 BASIC METABOLIC PNL TOTAL CA: CPT

## 2025-01-31 RX ORDER — VANCOMYCIN HYDROCHLORIDE 1 G/200ML
1000 INJECTION, SOLUTION INTRAVENOUS EVERY 12 HOURS
Status: DISCONTINUED | OUTPATIENT
Start: 2025-01-31 | End: 2025-02-01

## 2025-01-31 RX ORDER — METOPROLOL SUCCINATE 25 MG/1
25 TABLET, EXTENDED RELEASE ORAL DAILY
Status: DISCONTINUED | OUTPATIENT
Start: 2025-01-31 | End: 2025-02-02 | Stop reason: HOSPADM

## 2025-01-31 RX ORDER — MEROPENEM 1 G/1
1 INJECTION, POWDER, FOR SOLUTION INTRAVENOUS EVERY 8 HOURS
Status: DISCONTINUED | OUTPATIENT
Start: 2025-01-31 | End: 2025-02-02

## 2025-01-31 RX ADMIN — MEROPENEM 1 G: 1 INJECTION, POWDER, FOR SOLUTION INTRAVENOUS at 20:26

## 2025-01-31 RX ADMIN — MEROPENEM 1 G: 1 INJECTION, POWDER, FOR SOLUTION INTRAVENOUS at 03:06

## 2025-01-31 RX ADMIN — FAMOTIDINE 20 MG: 20 TABLET, FILM COATED ORAL at 09:00

## 2025-01-31 RX ADMIN — METOPROLOL SUCCINATE 25 MG: 25 TABLET, EXTENDED RELEASE ORAL at 13:10

## 2025-01-31 RX ADMIN — HEPARIN SODIUM 5000 UNITS: 5000 INJECTION INTRAVENOUS; SUBCUTANEOUS at 09:00

## 2025-01-31 RX ADMIN — ATORVASTATIN CALCIUM 40 MG: 40 TABLET, FILM COATED ORAL at 09:00

## 2025-01-31 RX ADMIN — FAMOTIDINE 20 MG: 20 TABLET, FILM COATED ORAL at 20:25

## 2025-01-31 RX ADMIN — VANCOMYCIN HYDROCHLORIDE 1000 MG: 1 INJECTION, SOLUTION INTRAVENOUS at 11:00

## 2025-01-31 RX ADMIN — MEROPENEM 1 G: 1 INJECTION, POWDER, FOR SOLUTION INTRAVENOUS at 10:00

## 2025-01-31 RX ADMIN — VANCOMYCIN HYDROCHLORIDE 1000 MG: 1 INJECTION, SOLUTION INTRAVENOUS at 10:00

## 2025-01-31 RX ADMIN — HEPARIN SODIUM 5000 UNITS: 5000 INJECTION INTRAVENOUS; SUBCUTANEOUS at 16:18

## 2025-01-31 RX ADMIN — VANCOMYCIN HYDROCHLORIDE 1000 MG: 1 INJECTION, SOLUTION INTRAVENOUS at 21:10

## 2025-01-31 ASSESSMENT — COGNITIVE AND FUNCTIONAL STATUS - GENERAL
MOBILITY SCORE: 21
DAILY ACTIVITIY SCORE: 24
STANDING UP FROM CHAIR USING ARMS: A LITTLE
WALKING IN HOSPITAL ROOM: A LITTLE
CLIMB 3 TO 5 STEPS WITH RAILING: A LITTLE

## 2025-01-31 ASSESSMENT — PAIN SCALES - GENERAL
PAINLEVEL_OUTOF10: 0 - NO PAIN
PAINLEVEL_OUTOF10: 0 - NO PAIN

## 2025-01-31 ASSESSMENT — PAIN - FUNCTIONAL ASSESSMENT: PAIN_FUNCTIONAL_ASSESSMENT: 0-10

## 2025-01-31 NOTE — PROGRESS NOTES
Subjective     Patient seen and examined. No acute overnight events.  Patient feels significantly better this morning.  Has been afebrile since yesterday.  Still having some pain with urination but his foot is also improving from yesterday.  Denies nausea, headache, fevers or chills, abdominal pain.      Assessment / Plan   Jakob Austin is a 69 y.o. male with a past ministry of CAD s/p PCI, hypertension, BPH, GERD who presented on 1/30 for left-sided flank pain and associated fever, chills and generalized weakness.    #Sepsis from urinary source  #Obstructing kidney stone  -Presented with high fever, rigors. Imaging revealed obstructing 7 mm left ureteral stone  -S/p left percutaneous nephrostomy tube after unsuccessful JJ stent placement  -Continue meropenem and vancomycin.  Urine and blood cultures pending  -Pain management with Tylenol, as needed morphine, Zofran for nausea  -Urology following, will need 10 days of antibiotics    #TARYN, improving  -Postrenal due to 7 mm calculus in the left ureter creatinine improving after percutaneous nephrostomy tube placement.  Continue to monitor BMP    #Hypertension  #CAD  #HLD  #BPH  -Restart metoprolol and aspirin today, continue holding ACE inhibitor. Continue statin    Duke Zabala MD  PGY-1, Internal Medicine    This is a preliminary note, please await attending attestation for final recommendations      Objective   Physical Exam  Vitals and nursing note reviewed.   Constitutional:       Appearance: Normal appearance.   Cardiovascular:      Rate and Rhythm: Normal rate and regular rhythm.   Pulmonary:      Effort: Pulmonary effort is normal.      Breath sounds: Normal breath sounds. No wheezing, rhonchi or rales.   Abdominal:      General: Abdomen is flat. There is no distension.      Palpations: Abdomen is soft.      Tenderness: There is no abdominal tenderness.      Comments: L nephrostomy tube in place   Musculoskeletal:      Right lower leg: No edema.      Left  "lower leg: No edema.   Skin:     General: Skin is warm and dry.   Neurological:      General: No focal deficit present.      Mental Status: He is alert and oriented to person, place, and time.       Last Recorded Vitals  Blood pressure 129/71, pulse 83, temperature 36.6 °C (97.9 °F), resp. rate 18, height 1.753 m (5' 9\"), weight 93.9 kg (207 lb 0.2 oz), SpO2 95%.  Intake/Output last 3 Shifts:  I/O last 3 completed shifts:  In: 5688.3 (60.6 mL/kg) [P.O.:580; I.V.:958.3 (10.2 mL/kg); IV Piggyback:4150]  Out: 1150 (12.2 mL/kg) [Urine:1150 (0.3 mL/kg/hr)]  Weight: 93.9 kg     Last CBC & BMP  Lab Results   Component Value Date    GLUCOSE 139 (H) 01/31/2025    CALCIUM 8.5 (L) 01/31/2025     01/31/2025    K 4.3 01/31/2025    CO2 26 01/31/2025     01/31/2025    BUN 28 (H) 01/31/2025    CREATININE 1.28 01/31/2025     Lab Results   Component Value Date    WBC 9.8 01/31/2025    HGB 10.8 (L) 01/31/2025    HCT 32.9 (L) 01/31/2025    MCV 90 01/31/2025     (L) 01/31/2025     "

## 2025-01-31 NOTE — PROGRESS NOTES
"Jakob Austin is a 69 y.o. male on day 1 of admission presenting with Ureteral stone.    Subjective   Left 7 mm distal ureteral stone, uti, sepsis.  Sp aborted left jj stent insertion and pcnt placement by interventional radiology.  IR appreciated.  Feels a lot better  Afebrile  Leukocytosis resolved  Vss  Voiding without hematuria  He states it burns a little but has improved  Urine and blood cultures pending       Objective     Physical Exam  Genitourinary:     Comments: Left pcnt draining clear yellow with few small clots        Last Recorded Vitals  Blood pressure 129/71, pulse 83, temperature 36.6 °C (97.9 °F), temperature source Temporal, resp. rate 18, height 1.753 m (5' 9\"), weight 93.9 kg (207 lb 0.2 oz), SpO2 95%.  Intake/Output last 3 Shifts:  I/O last 3 completed shifts:  In: 5688.3 (60.6 mL/kg) [P.O.:580; I.V.:958.3 (10.2 mL/kg); IV Piggyback:4150]  Out: 1150 (12.2 mL/kg) [Urine:1150 (0.3 mL/kg/hr)]  Weight: 93.9 kg     Relevant Results  Results for orders placed or performed during the hospital encounter of 01/29/25 (from the past 24 hours)   Protime-INR   Result Value Ref Range    Protime 16.3 (H) 9.8 - 12.8 seconds    INR 1.4 (H) 0.9 - 1.1   CBC   Result Value Ref Range    WBC 9.8 4.4 - 11.3 x10*3/uL    nRBC 0.0 0.0 - 0.0 /100 WBCs    RBC 3.66 (L) 4.50 - 5.90 x10*6/uL    Hemoglobin 10.8 (L) 13.5 - 17.5 g/dL    Hematocrit 32.9 (L) 41.0 - 52.0 %    MCV 90 80 - 100 fL    MCH 29.5 26.0 - 34.0 pg    MCHC 32.8 32.0 - 36.0 g/dL    RDW 13.9 11.5 - 14.5 %    Platelets 141 (L) 150 - 450 x10*3/uL   Vancomycin, Trough   Result Value Ref Range    Vancomycin, Trough 5.8 5.0 - 20.0 ug/mL   Basic metabolic panel   Result Value Ref Range    Glucose 139 (H) 74 - 99 mg/dL    Sodium 138 136 - 145 mmol/L    Potassium 4.3 3.5 - 5.3 mmol/L    Chloride 106 98 - 107 mmol/L    Bicarbonate 26 21 - 32 mmol/L    Anion Gap 10 10 - 20 mmol/L    Urea Nitrogen 28 (H) 6 - 23 mg/dL    Creatinine 1.28 0.50 - 1.30 mg/dL    eGFR 60 (L) " >60 mL/min/1.73m*2    Calcium 8.5 (L) 8.6 - 10.3 mg/dL   Magnesium   Result Value Ref Range    Magnesium 1.92 1.60 - 2.40 mg/dL      IR placement of nephrostomy catheter    Result Date: 1/30/2025  Interpreted By:  Yung Vasquez, STUDY: IR  NEPHROSTOMY PLACEMENT; 1/30/2025 3:15 pm   INDICATION: Signs/Symptoms:Patient needs left percutaneous nephrostomy tube.   COMPARISON: None.   ACCESSION NUMBER(S): [id number]   ORDERING CLINICIAN: NAOMI LEVIN   TECHNIQUE: LEFT-SIDED PERCUTANEOUS NEPHROSTOMY DRAINAGE CATHETER PLACEMENT ANTEGRADE PYELOGRAM DIRECT ULTRASOUND AND FLUOROSCOPIC GUIDANCE   The procedure and potential complications including the benefits, risks, and alternatives were discussed with the patient. Voluntary signed informed consent was obtained. The patient and medical history were assessed by the vascular interventional radiology team.   No conscious sedation risks or contraindications were identified. Moderate sedation was administered by a dedicated angiography nurse with Fentanyl  50 mcg  IV. Continuous cardiopulmonary monitoring in addition to serial vital signs were performed.   The total fluoroscopy time was  1.2 minutes. 14.6 mGy 81771 mGycm2   In the prone position, the patient was positioned on the angiography table. The cutaneous tissues of the  left flank were prepared and draped in usual sterile manner. Screening evaluation of the  left kidney was performed for direct ultrasound guidance.  A posterior calyx in the inferior pole of the kidney was identified and targeted. The optimal access site and tract were locally anesthetized with subcutaneous Lidocaine 1% instillation. Utilizing direct ultrasound guidance, the targeted calyx was accessed with a 22-gauge Chiba needle. After confirmation of location, a digital spot ultrasound image was acquired.   There was return of  non-purulent urine through the access needle. The collecting system was decompressed with aspiration. The calyceal  system and central renal pelvis were minimally distended with dilute contrast to facilitate antegrade access and fluoroscopic guidance. An antegrade pyelogram was performed.   FINDINGS: ANTEGRADE PYELOGRAM FINDINGS: Moderate left hydroureteronephrosis. Obstructing stone noted in the left distal ureter.   PERCUTANEOUS NEPHROSTOMY DRAINAGE CATHETER PLACEMENT: A 018 [place] guidewire was inserted through the access needle. Utilizing guidewire manipulation, access into the Field  proximal ureter was obtained. With the guidewire left in place to secure access, a 7-Azerbaijani coaxial dilator sheath system was placed. The sheath was advanced into the central renal pelvis. A 035  stiff Glide guidewire was inserted through the access sheath into the  proximal ureter to secure access.   There was subsequent placement of  a 10-[place] nephrostomy drainage catheter over the 035 guidewire. The pigtail was formed in the central renal pelvis.  Diluted contrast injection was performed to confirm optimal location. The external portions of the catheter were secured with sterile suture and dressing.   The patient tolerated the procedure without complication.       1. Uncomplicated and technically successful  10-Azerbaijani percutaneous nephrostomy tube placement -  left kidney.  The catheter was connected to external gravity drainage. 2. Return and identification of  non-purulent urine.     I was present for and/or performed the critical portions of the procedure and immediately available throughout the entire procedure. I personally reviewed the image(s) / study and resident interpretation. I agree with the findings as stated. Dictated at [facility].   Signed by: Yung Vasquez 1/30/2025 4:43 PM Dictation workstation:   [id number]    FL less than 1 hour    Result Date: 1/30/2025  These images are not reportable by radiology and will not be interpreted by  Radiologists.    CT abdomen  pelvis w IV contrast    Result Date: 1/29/2025  Interpreted By:  Mil Beltre, STUDY: CT ABDOMEN PELVIS W IV CONTRAST;  1/29/2025 10:16 pm   INDICATION: Signs/Symptoms:Abdominal pain, fever, tachycardia.   COMPARISON: CT scan of the abdomen pelvis 06/11/2020.   ACCESSION NUMBER(S): [id number]   ORDERING CLINICIAN: EVON HU   TECHNIQUE: Axial CT images of the abdomen and pelvis with coronal and sagittal reconstructed images obtained after intravenous administration of 75 mL of Omnipaque 350   FINDINGS: LOWER CHEST: Emphysematous changes noted in the lung bases. Bibasilar atelectasis and or scarring. Coronary artery calcifications noted. Moderate-size hiatal hernia.   ABDOMEN:   LIVER: Normal morphology. Liver is hypodense relative to the spleen which can be seen in the setting of steatosis. BILE DUCTS: Normal caliber. GALLBLADDER: No calcified gallstones. No wall thickening. PANCREAS: Within normal limits. SPLEEN: Splenomegaly measuring 16 cm in craniocaudal dimension. ADRENALS: Nodular thickening of the adrenal glands may relate to hyperplasia or adenomatous change. KIDNEYS and URETERS: There is delayed contrast enhancement of the left kidney relative to the right. There is left-sided hydronephrosis and hydroureter which extends to the level of the distal ureter where an obstructing 7 mm calculus is noted just proximal to the left UVJ. There is left perinephric inflammatory stranding with peripheral wedge-shaped areas of hypoattenuation which raises concern for developing pyelonephritis. Right kidney is normal size without evidence for calculi, hydronephrosis, hydroureter or perinephric inflammatory changes. Subcentimeter hypodense foci bilaterally are too small to characterize.   VESSELS:  Calcific atherosclerosis of the aortoiliac vessels. No aortic aneurysm. RETROPERITONEUM: No pathologically enlarged retroperitoneal lymph nodes.   PELVIS:   REPRODUCTIVE ORGANS: Central prostatic calcifications noted.  Prostate gland protrudes into the base of the bladder. BLADDER: Bladder is underdistended with mild wall thickening.   BOWEL: Moderate-size hiatal hernia. Stomach is underdistended. Visualized loops of bowel are without evidence for obstruction. Scattered colonic diverticula without evidence for acute diverticulitis. No pneumatosis or portal venous gas.  Normal appendix. PERITONEUM: No ascites or free air, no fluid collection.   ABDOMINAL WALL: Patulous inguinal canals containing fat. Small umbilical hernia contains fat. BONES: Multilevel degenerative changes of the spine.       Left-sided hydronephrosis and hydroureter extending to the distal ureter where an obstructing 7 mm calculus is identified just proximal to the UVJ. There is delayed contrast enhancement of the left kidney with perinephric inflammatory stranding. There subtle peripheral wedge-shaped areas of hypoattenuation in the left kidney which raises concern for developing pyelonephritis. Correlate with urinalysis.   Diffuse hepatic steatosis.   Stable splenomegaly.   Diverticulosis without evidence for acute diverticulitis.   Additional findings as described above.     MACRO: None   Signed by: Mil Beltre 1/29/2025 11:11 PM Dictation workstation:   [id number]    Scheduled medications  [Held by provider] aspirin, 81 mg, oral, Daily  atorvastatin, 40 mg, oral, Daily  famotidine, 20 mg, oral, BID  heparin (porcine), 5,000 Units, subcutaneous, q8h  lactated Ringer's, 1,000 mL, intravenous, Once  meropenem, 1 g, intravenous, q8h  vancomycin, 1,000 mg, intravenous, q12h      Continuous medications     PRN medications  PRN medications: acetaminophen, alum-mag hydroxide-simeth, morphine, ondansetron, vancomycin             This patient currently has cardiac telemetry ordered; if you would like to modify or discontinue the telemetry order, click here to go to the orders activity to modify/discontinue the order.                 Assessment/Plan   Assessment &  Plan  Ureteral stone    Sepsis (Multi)    Calculus of ureter    Left obstructing 7mm distal ureteral stone and febrile uti/sepsis  Improving  Will order kub for opacity  Discussed with him the need for antegrade jj stent placement after full course of antibiotics  And then definitive stone rx either eswl or flexible ureteroscopy given difficulty from j hooking and enlarged prostate       I spent 20 minutes in the professional and overall care of this patient.      Dion Salomon PA-C

## 2025-01-31 NOTE — PROGRESS NOTES
Vancomycin Dosing by Pharmacy- FOLLOW UP    Jakob Austin is a 69 y.o. year old male who Pharmacy has been consulted for vancomycin dosing for pyelonephritis. Based on the patient's indication and renal status this patient is being dosed based on a goal trough/random level of 10-15.     Renal function is currently improving.    Last vancomycin dose: 2000 mg given at 0400 on     Most recent trough level: 5.8 mcg/mL    Visit Vitals  /70   Pulse 83   Temp 35.9 °C (96.6 °F)   Resp 18        Lab Results   Component Value Date    CREATININE 1.28 2025    CREATININE 1.42 (H) 2025    CREATININE 1.53 (H) 2025    CREATININE 0.94 10/08/2024        Patient weight is as follows:   Vitals:    25 1013   Weight: 93.9 kg (207 lb 0.2 oz)       Cultures:  No results found for the encounter in last 14 days.       I/O last 3 completed shifts:  In: 5688.3 (60.6 mL/kg) [P.O.:580; I.V.:958.3 (10.2 mL/kg); IV Piggyback:4150]  Out: 1150 (12.2 mL/kg) [Urine:1150 (0.3 mL/kg/hr)]  Weight: 93.9 kg   I/O during current shift:  No intake/output data recorded.    Temp (24hrs), Av.8 °C (98.2 °F), Min:35.9 °C (96.6 °F), Max:39.9 °C (103.8 °F)      Assessment/Plan    Below goal random/trough level. Orders placed for new vancomycin regimen of 1000 mg every 12 hours to begin at 0900 . Switching to AUC dosing with a goal of 400 to 600 as renal function is improving in the setting of stent placement for obstructing stone.    This dosing regimen is predicted by [employer] to result in the following pharmacokinetic parameters:  Regimen: 1000 mg IV every 12 hours.  Start time: 09:00 on 2025  Exposure target: AUC24 (range)400-600 mg/L.hr   NAP32-45: 416 mg/L.hr  AUC24,ss: 520 mg/L.hr  Probability of AUC24 > 400: 85 %  Ctrough,ss: 18 mg/L  Probability of Ctrough,ss > 20: 41 %    The next level will be obtained on 2 at AM labs. May be obtained sooner if clinically indicated.   Will continue to monitor renal function  daily while on vancomycin and order serum creatinine at least every 48 hours if not already ordered.  Follow for continued vancomycin needs, clinical response, and signs/symptoms of toxicity.       Elizabeth Garcias, PharmD

## 2025-01-31 NOTE — CARE PLAN
The patient's goals for the shift include      The clinical goals for the shift include hds      Problem: Pain - Adult  Goal: Verbalizes/displays adequate comfort level or baseline comfort level  Outcome: Progressing     Problem: Safety - Adult  Goal: Free from fall injury  Outcome: Progressing     Problem: Discharge Planning  Goal: Discharge to home or other facility with appropriate resources  Outcome: Progressing     Problem: Chronic Conditions and Co-morbidities  Goal: Patient's chronic conditions and co-morbidity symptoms are monitored and maintained or improved  Outcome: Progressing     Problem: Nutrition  Goal: Nutrient intake appropriate for maintaining nutritional needs  Outcome: Progressing     Problem: Pain  Goal: Turns in bed with improved pain control throughout the shift  Outcome: Progressing  Goal: Free from opioid side effects throughout the shift  Outcome: Progressing  Goal: Free from acute confusion related to pain meds throughout the shift  Outcome: Progressing

## 2025-01-31 NOTE — PROGRESS NOTES
01/31/25 1116   Discharge Planning   Living Arrangements Spouse/significant other   Support Systems Spouse/significant other   Assistance Needed Independent, patient does drive   Type of Residence Private residence   Number of Stairs to Enter Residence 1   Home or Post Acute Services None   Expected Discharge Disposition Home     Met with patient at bedside, introduced self and role on care transitions team. Admission assessment completed with patient. Address, insurance and contact information verified with patient. Patient lives at home with his wife. Spoke with nursing, patient had nephrostomy tube placed yesterday. Offered patient home health care at discharge. Patient has declined need for home health care at discharge. Patient plans to return home at discharge and has declined any home going needs.    PCP: Dr. Fabian Miller

## 2025-02-01 LAB
ANION GAP SERPL CALC-SCNC: 12 MMOL/L (ref 10–20)
BUN SERPL-MCNC: 29 MG/DL (ref 6–23)
CALCIUM SERPL-MCNC: 9 MG/DL (ref 8.6–10.3)
CHLORIDE SERPL-SCNC: 105 MMOL/L (ref 98–107)
CO2 SERPL-SCNC: 27 MMOL/L (ref 21–32)
CREAT SERPL-MCNC: 1.23 MG/DL (ref 0.5–1.3)
EGFRCR SERPLBLD CKD-EPI 2021: 63 ML/MIN/1.73M*2
ERYTHROCYTE [DISTWIDTH] IN BLOOD BY AUTOMATED COUNT: 14.1 % (ref 11.5–14.5)
FERRITIN SERPL-MCNC: 1001 NG/ML (ref 20–300)
FOLATE SERPL-MCNC: 11 NG/ML
GLUCOSE SERPL-MCNC: 108 MG/DL (ref 74–99)
HCT VFR BLD AUTO: 37.1 % (ref 41–52)
HGB BLD-MCNC: 12.2 G/DL (ref 13.5–17.5)
IRON SATN MFR SERPL: 19 % (ref 25–45)
IRON SERPL-MCNC: 39 UG/DL (ref 35–150)
MCH RBC QN AUTO: 29.5 PG (ref 26–34)
MCHC RBC AUTO-ENTMCNC: 32.9 G/DL (ref 32–36)
MCV RBC AUTO: 90 FL (ref 80–100)
NRBC BLD-RTO: 0 /100 WBCS (ref 0–0)
PLATELET # BLD AUTO: 191 X10*3/UL (ref 150–450)
POTASSIUM SERPL-SCNC: 4.3 MMOL/L (ref 3.5–5.3)
RBC # BLD AUTO: 4.13 X10*6/UL (ref 4.5–5.9)
SODIUM SERPL-SCNC: 140 MMOL/L (ref 136–145)
TIBC SERPL-MCNC: 204 UG/DL (ref 240–445)
TRANSFERRIN SERPL-MCNC: 166 MG/DL (ref 200–360)
UIBC SERPL-MCNC: 165 UG/DL (ref 110–370)
VIT B12 SERPL-MCNC: 440 PG/ML (ref 211–911)
WBC # BLD AUTO: 9.3 X10*3/UL (ref 4.4–11.3)

## 2025-02-01 PROCEDURE — 2500000001 HC RX 250 WO HCPCS SELF ADMINISTERED DRUGS (ALT 637 FOR MEDICARE OP)

## 2025-02-01 PROCEDURE — 36415 COLL VENOUS BLD VENIPUNCTURE: CPT

## 2025-02-01 PROCEDURE — 2500000001 HC RX 250 WO HCPCS SELF ADMINISTERED DRUGS (ALT 637 FOR MEDICARE OP): Performed by: STUDENT IN AN ORGANIZED HEALTH CARE EDUCATION/TRAINING PROGRAM

## 2025-02-01 PROCEDURE — 1200000002 HC GENERAL ROOM WITH TELEMETRY DAILY

## 2025-02-01 PROCEDURE — 2500000004 HC RX 250 GENERAL PHARMACY W/ HCPCS (ALT 636 FOR OP/ED): Performed by: UROLOGY

## 2025-02-01 PROCEDURE — 82607 VITAMIN B-12: CPT | Mod: PARLAB

## 2025-02-01 PROCEDURE — 82746 ASSAY OF FOLIC ACID SERUM: CPT | Mod: PARLAB

## 2025-02-01 PROCEDURE — 84466 ASSAY OF TRANSFERRIN: CPT | Mod: PARLAB

## 2025-02-01 PROCEDURE — 85027 COMPLETE CBC AUTOMATED: CPT | Performed by: STUDENT IN AN ORGANIZED HEALTH CARE EDUCATION/TRAINING PROGRAM

## 2025-02-01 PROCEDURE — 2500000001 HC RX 250 WO HCPCS SELF ADMINISTERED DRUGS (ALT 637 FOR MEDICARE OP): Performed by: UROLOGY

## 2025-02-01 PROCEDURE — 2500000004 HC RX 250 GENERAL PHARMACY W/ HCPCS (ALT 636 FOR OP/ED): Performed by: STUDENT IN AN ORGANIZED HEALTH CARE EDUCATION/TRAINING PROGRAM

## 2025-02-01 PROCEDURE — 82728 ASSAY OF FERRITIN: CPT | Mod: PARLAB

## 2025-02-01 PROCEDURE — 36415 COLL VENOUS BLD VENIPUNCTURE: CPT | Performed by: STUDENT IN AN ORGANIZED HEALTH CARE EDUCATION/TRAINING PROGRAM

## 2025-02-01 PROCEDURE — 99232 SBSQ HOSP IP/OBS MODERATE 35: CPT

## 2025-02-01 PROCEDURE — 80048 BASIC METABOLIC PNL TOTAL CA: CPT

## 2025-02-01 PROCEDURE — 83540 ASSAY OF IRON: CPT

## 2025-02-01 RX ORDER — POLYETHYLENE GLYCOL 3350 17 G/17G
17 POWDER, FOR SOLUTION ORAL DAILY PRN
Status: DISCONTINUED | OUTPATIENT
Start: 2025-02-01 | End: 2025-02-02 | Stop reason: HOSPADM

## 2025-02-01 RX ORDER — SENNOSIDES 8.6 MG/1
1 TABLET ORAL 2 TIMES DAILY
Status: DISCONTINUED | OUTPATIENT
Start: 2025-02-01 | End: 2025-02-02 | Stop reason: HOSPADM

## 2025-02-01 RX ORDER — LISINOPRIL 5 MG/1
5 TABLET ORAL DAILY
Status: DISCONTINUED | OUTPATIENT
Start: 2025-02-01 | End: 2025-02-02 | Stop reason: HOSPADM

## 2025-02-01 RX ADMIN — METOPROLOL SUCCINATE 25 MG: 25 TABLET, EXTENDED RELEASE ORAL at 08:53

## 2025-02-01 RX ADMIN — MEROPENEM 1 G: 1 INJECTION, POWDER, FOR SOLUTION INTRAVENOUS at 04:34

## 2025-02-01 RX ADMIN — LISINOPRIL 5 MG: 5 TABLET ORAL at 11:10

## 2025-02-01 RX ADMIN — VANCOMYCIN HYDROCHLORIDE 1000 MG: 1 INJECTION, SOLUTION INTRAVENOUS at 08:53

## 2025-02-01 RX ADMIN — ALUMINUM HYDROXIDE, MAGNESIUM HYDROXIDE, AND DIMETHICONE 10 ML: 200; 20; 200 SUSPENSION ORAL at 22:28

## 2025-02-01 RX ADMIN — SENNOSIDES 8.6 MG: 8.6 TABLET, FILM COATED ORAL at 11:10

## 2025-02-01 RX ADMIN — ATORVASTATIN CALCIUM 40 MG: 40 TABLET, FILM COATED ORAL at 08:53

## 2025-02-01 RX ADMIN — HEPARIN SODIUM 5000 UNITS: 5000 INJECTION INTRAVENOUS; SUBCUTANEOUS at 15:42

## 2025-02-01 RX ADMIN — FAMOTIDINE 20 MG: 20 TABLET, FILM COATED ORAL at 21:48

## 2025-02-01 RX ADMIN — HEPARIN SODIUM 5000 UNITS: 5000 INJECTION INTRAVENOUS; SUBCUTANEOUS at 00:10

## 2025-02-01 RX ADMIN — MEROPENEM 1 G: 1 INJECTION, POWDER, FOR SOLUTION INTRAVENOUS at 21:40

## 2025-02-01 RX ADMIN — SENNOSIDES 8.6 MG: 8.6 TABLET, FILM COATED ORAL at 21:47

## 2025-02-01 RX ADMIN — PSYLLIUM HUSK 1 PACKET: 3.4 POWDER ORAL at 15:42

## 2025-02-01 RX ADMIN — ASPIRIN 81 MG: 81 TABLET, COATED ORAL at 08:53

## 2025-02-01 RX ADMIN — FAMOTIDINE 20 MG: 20 TABLET, FILM COATED ORAL at 08:53

## 2025-02-01 RX ADMIN — HEPARIN SODIUM 5000 UNITS: 5000 INJECTION INTRAVENOUS; SUBCUTANEOUS at 08:53

## 2025-02-01 RX ADMIN — MEROPENEM 1 G: 1 INJECTION, POWDER, FOR SOLUTION INTRAVENOUS at 12:28

## 2025-02-01 ASSESSMENT — COGNITIVE AND FUNCTIONAL STATUS - GENERAL
DAILY ACTIVITIY SCORE: 24
MOBILITY SCORE: 23
CLIMB 3 TO 5 STEPS WITH RAILING: A LITTLE

## 2025-02-01 ASSESSMENT — PAIN - FUNCTIONAL ASSESSMENT
PAIN_FUNCTIONAL_ASSESSMENT: 0-10

## 2025-02-01 ASSESSMENT — PAIN SCALES - GENERAL
PAINLEVEL_OUTOF10: 0 - NO PAIN

## 2025-02-01 NOTE — PROGRESS NOTES
Subjective:  The patient states that his appetite is improving.  He states he is feeling much better overall.  He also complains of having some bloating and requests additional bowel regimen.  He states his last bowel movement was this morning but that it was small and he had some straining.       Physical Exam  Constitutional: No respiratory acute distress, cooperative, answers questions appropriately, obese  Eyes: EOMI, clear sclera  ENMT: mucous membranes moist  Head/Neck: Neck supple, Trachea midline  Respiratory/Thorax: CTAB, no wheezes  Cardiovascular:  distal pulses 2+, no edema, tachycardia  Gastrointestinal: non tender, no palpable masses, mildly distended  Musculoskeletal: ROM intact, no gross deformity  Neurological: No focal deficits, normal sensation  Psychological: Appropriate mood and behavior  Skin: Warm and dry, nephrostomy tube on left side with serosanguineous drainage.     Last Recorded Vitals  /85 (BP Location: Right arm, Patient Position: Sitting)   Pulse 98   Temp 36.4 °C (97.5 °F) (Temporal)   Resp 18   Wt 93.9 kg (207 lb 0.2 oz)   SpO2 95%     Relevant Results  Scheduled medications  aspirin, 81 mg, oral, Daily  atorvastatin, 40 mg, oral, Daily  famotidine, 20 mg, oral, BID  heparin (porcine), 5,000 Units, subcutaneous, q8h  meropenem, 1 g, intravenous, q8h  metoprolol succinate XL, 25 mg, oral, Daily  sennosides, 1 tablet, oral, BID  vancomycin, 1,000 mg, intravenous, q12h        Continuous medications     PRN medications  PRN medications: acetaminophen, alum-mag hydroxide-simeth, morphine, ondansetron, polyethylene glycol, vancomycin  Results for orders placed or performed during the hospital encounter of 01/29/25 (from the past 24 hours)   Basic metabolic panel   Result Value Ref Range    Glucose 108 (H) 74 - 99 mg/dL    Sodium 140 136 - 145 mmol/L    Potassium 4.3 3.5 - 5.3 mmol/L    Chloride 105 98 - 107 mmol/L    Bicarbonate 27 21 - 32 mmol/L    Anion Gap 12 10 - 20 mmol/L     Urea Nitrogen 29 (H) 6 - 23 mg/dL    Creatinine 1.23 0.50 - 1.30 mg/dL    eGFR 63 >60 mL/min/1.73m*2    Calcium 9.0 8.6 - 10.3 mg/dL   CBC   Result Value Ref Range    WBC 9.3 4.4 - 11.3 x10*3/uL    nRBC 0.0 0.0 - 0.0 /100 WBCs    RBC 4.13 (L) 4.50 - 5.90 x10*6/uL    Hemoglobin 12.2 (L) 13.5 - 17.5 g/dL    Hematocrit 37.1 (L) 41.0 - 52.0 %    MCV 90 80 - 100 fL    MCH 29.5 26.0 - 34.0 pg    MCHC 32.9 32.0 - 36.0 g/dL    RDW 14.1 11.5 - 14.5 %    Platelets 191 150 - 450 x10*3/uL     [unfilled]      Assessment/Plan   Assessment:  Patient is a 69-year-old man with a past medical history of CAD status post PCI, BPH, GERD and hypertension who presented on 1-30 for left-sided flank pain, found to have obstructing kidney stone and admitted to general medicine.    #Sepsis, improving  #Obstructive kidney stone  #UTI, Enterococcus faecalis  - Left percutaneous nephrostomy tube in place after failed stent placement.  May need to have further procedure for stent placement, defer to urology team.  -Blood cultures pending, no growth at day 1.  Urine cultures with enteric faecalis growth, continuing meropenem and vancomycin for 10-day course  #TARYN, improving  - Monitor creatinine and urine output  #Anemia  -Suspect secondary to acute blood loss however will complete iron studies and replete if necessary. % sat indicative of iron deficiency, recommend supplemental iron 10 days after course of infection is completed.  #Constipation  -Patient was initiated on Senokot twice daily with as needed MiraLAX last bowel movement was 2 - 1 - 25.    Chronic issues: #Hypertension  #CAD, #HLD, #BPH  -Continue home metoprolol and aspirin.  Consider restarting lisinopril 5 mg as pressures have stabilized and are increasing.    I have reviewed and interpreted all lab test imaging studies and documentations from other healthcare providers. Case to be discussed with attending, A&P above reflect tentative plan. Please await for final signature from  attending physician on service.    Leticia Reina MD PGY-3  Please message me if you have any questions

## 2025-02-01 NOTE — CARE PLAN
The patient's goals for the shift include      The clinical goals for the shift include pt to remain hemodynamically stable.      Problem: Pain - Adult  Goal: Verbalizes/displays adequate comfort level or baseline comfort level  Outcome: Progressing     Problem: Safety - Adult  Goal: Free from fall injury  Outcome: Progressing     Problem: Discharge Planning  Goal: Discharge to home or other facility with appropriate resources  Outcome: Progressing     Problem: Chronic Conditions and Co-morbidities  Goal: Patient's chronic conditions and co-morbidity symptoms are monitored and maintained or improved  Outcome: Progressing     Problem: Nutrition  Goal: Nutrient intake appropriate for maintaining nutritional needs  Outcome: Progressing     Problem: Pain  Goal: Turns in bed with improved pain control throughout the shift  Outcome: Progressing  Goal: Free from opioid side effects throughout the shift  Outcome: Progressing  Goal: Free from acute confusion related to pain meds throughout the shift  Outcome: Progressing

## 2025-02-01 NOTE — PROGRESS NOTES
Vancomycin Dosing by Pharmacy- Cessation of Therapy    Consult to pharmacy for vancomycin dosing has been discontinued by the prescriber, pharmacy will sign off at this time.    Please call pharmacy if there are further questions or re-enter a consult if vancomycin is resumed.     Elizabeth Garcias, PharmD

## 2025-02-02 VITALS
DIASTOLIC BLOOD PRESSURE: 78 MMHG | TEMPERATURE: 98.1 F | HEIGHT: 69 IN | WEIGHT: 207.01 LBS | BODY MASS INDEX: 30.66 KG/M2 | SYSTOLIC BLOOD PRESSURE: 145 MMHG | RESPIRATION RATE: 16 BRPM | OXYGEN SATURATION: 94 % | HEART RATE: 100 BPM

## 2025-02-02 LAB
ANION GAP SERPL CALC-SCNC: 14 MMOL/L (ref 10–20)
BACTERIA BLD CULT: NORMAL
BACTERIA BLD CULT: NORMAL
BACTERIA UR CULT: ABNORMAL
BUN SERPL-MCNC: 21 MG/DL (ref 6–23)
CALCIUM SERPL-MCNC: 8.6 MG/DL (ref 8.6–10.3)
CHLORIDE SERPL-SCNC: 105 MMOL/L (ref 98–107)
CO2 SERPL-SCNC: 27 MMOL/L (ref 21–32)
CREAT SERPL-MCNC: 1.14 MG/DL (ref 0.5–1.3)
EGFRCR SERPLBLD CKD-EPI 2021: 69 ML/MIN/1.73M*2
ERYTHROCYTE [DISTWIDTH] IN BLOOD BY AUTOMATED COUNT: 13.7 % (ref 11.5–14.5)
GLUCOSE SERPL-MCNC: 101 MG/DL (ref 74–99)
HCT VFR BLD AUTO: 33.2 % (ref 41–52)
HGB BLD-MCNC: 11 G/DL (ref 13.5–17.5)
MAGNESIUM SERPL-MCNC: 1.76 MG/DL (ref 1.6–2.4)
MCH RBC QN AUTO: 29.6 PG (ref 26–34)
MCHC RBC AUTO-ENTMCNC: 33.1 G/DL (ref 32–36)
MCV RBC AUTO: 90 FL (ref 80–100)
NRBC BLD-RTO: 0 /100 WBCS (ref 0–0)
PLATELET # BLD AUTO: 185 X10*3/UL (ref 150–450)
POTASSIUM SERPL-SCNC: 3.9 MMOL/L (ref 3.5–5.3)
RBC # BLD AUTO: 3.71 X10*6/UL (ref 4.5–5.9)
SODIUM SERPL-SCNC: 142 MMOL/L (ref 136–145)
WBC # BLD AUTO: 6.9 X10*3/UL (ref 4.4–11.3)

## 2025-02-02 PROCEDURE — 85027 COMPLETE CBC AUTOMATED: CPT | Performed by: STUDENT IN AN ORGANIZED HEALTH CARE EDUCATION/TRAINING PROGRAM

## 2025-02-02 PROCEDURE — 2500000001 HC RX 250 WO HCPCS SELF ADMINISTERED DRUGS (ALT 637 FOR MEDICARE OP): Performed by: STUDENT IN AN ORGANIZED HEALTH CARE EDUCATION/TRAINING PROGRAM

## 2025-02-02 PROCEDURE — 80048 BASIC METABOLIC PNL TOTAL CA: CPT | Performed by: STUDENT IN AN ORGANIZED HEALTH CARE EDUCATION/TRAINING PROGRAM

## 2025-02-02 PROCEDURE — 99239 HOSP IP/OBS DSCHRG MGMT >30: CPT

## 2025-02-02 PROCEDURE — 36415 COLL VENOUS BLD VENIPUNCTURE: CPT | Performed by: STUDENT IN AN ORGANIZED HEALTH CARE EDUCATION/TRAINING PROGRAM

## 2025-02-02 PROCEDURE — 2500000001 HC RX 250 WO HCPCS SELF ADMINISTERED DRUGS (ALT 637 FOR MEDICARE OP)

## 2025-02-02 PROCEDURE — 2500000004 HC RX 250 GENERAL PHARMACY W/ HCPCS (ALT 636 FOR OP/ED): Performed by: UROLOGY

## 2025-02-02 PROCEDURE — 2500000001 HC RX 250 WO HCPCS SELF ADMINISTERED DRUGS (ALT 637 FOR MEDICARE OP): Performed by: UROLOGY

## 2025-02-02 PROCEDURE — 83735 ASSAY OF MAGNESIUM: CPT | Performed by: STUDENT IN AN ORGANIZED HEALTH CARE EDUCATION/TRAINING PROGRAM

## 2025-02-02 PROCEDURE — 2500000004 HC RX 250 GENERAL PHARMACY W/ HCPCS (ALT 636 FOR OP/ED): Performed by: STUDENT IN AN ORGANIZED HEALTH CARE EDUCATION/TRAINING PROGRAM

## 2025-02-02 RX ORDER — CIPROFLOXACIN 500 MG/1
500 TABLET ORAL EVERY 12 HOURS SCHEDULED
Status: DISCONTINUED | OUTPATIENT
Start: 2025-02-02 | End: 2025-02-02 | Stop reason: HOSPADM

## 2025-02-02 RX ORDER — CIPROFLOXACIN 500 MG/1
500 TABLET ORAL EVERY 12 HOURS SCHEDULED
Qty: 8 TABLET | Refills: 0 | Status: SHIPPED | OUTPATIENT
Start: 2025-02-02 | End: 2025-02-06

## 2025-02-02 RX ADMIN — METOPROLOL SUCCINATE 25 MG: 25 TABLET, EXTENDED RELEASE ORAL at 08:36

## 2025-02-02 RX ADMIN — LISINOPRIL 5 MG: 5 TABLET ORAL at 08:36

## 2025-02-02 RX ADMIN — MEROPENEM 1 G: 1 INJECTION, POWDER, FOR SOLUTION INTRAVENOUS at 04:38

## 2025-02-02 RX ADMIN — ASPIRIN 81 MG: 81 TABLET, COATED ORAL at 08:36

## 2025-02-02 RX ADMIN — CIPROFLOXACIN 500 MG: 500 TABLET ORAL at 09:56

## 2025-02-02 RX ADMIN — HEPARIN SODIUM 5000 UNITS: 5000 INJECTION INTRAVENOUS; SUBCUTANEOUS at 08:37

## 2025-02-02 RX ADMIN — ATORVASTATIN CALCIUM 40 MG: 40 TABLET, FILM COATED ORAL at 08:36

## 2025-02-02 RX ADMIN — FAMOTIDINE 20 MG: 20 TABLET, FILM COATED ORAL at 08:36

## 2025-02-02 NOTE — DISCHARGE INSTRUCTIONS
It is recommended that you follow up with your primary care doctor in 1 week to ensure that you are transitioning well from hospital to home.  Please follow up with Dr. Barclay from urology in 1-2 weeks to discuss next steps for definitive stone treatment.

## 2025-02-02 NOTE — DISCHARGE SUMMARY
Discharge Diagnosis  Ureteral stone    Issues Requiring Follow-Up  GERD  HTN    Discharge Meds     Medication List      CHANGE how you take these medications     ciprofloxacin 500 mg tablet; Commonly known as: Cipro; Take 1 tablet   (500 mg) by mouth every 12 hours for 4 days.; What changed: when to take   this     CONTINUE taking these medications     ascorbic acid 500 mg ER capsule; Commonly known as: Vitamin C   aspirin 81 mg EC tablet   atorvastatin 40 mg tablet; Commonly known as: Lipitor   flaxseed oiL 1,000 mg capsule   lisinopril 5 mg tablet; TAKE 1 TABLET BY MOUTH DAILY   metoprolol succinate XL 25 mg 24 hr tablet; Commonly known as:   Toprol-XL; Take 1 tablet (25 mg) by mouth once daily. Do not crush or   chew.   multivitamin with minerals tablet   sildenafil 100 mg tablet; Commonly known as: Viagra; TAKE 1 TABLET BY   MOUTH 1 HOUR BEFORE NEEDED.   turmeric root extract 500 mg capsule     ASK your doctor about these medications     gabapentin 300 mg capsule; Commonly known as: Neurontin; Take 1 capsule   (300 mg) by mouth 3 times a day as needed (mild pain) for up to 15 doses.   oxyCODONE 5 mg immediate release tablet; Commonly known as: Roxicodone;   Take 1 tablet (5 mg) by mouth every 6 hours if needed for severe pain (7 -   10) (Take if pain is not relieved by combination of gabapentin, Tylenol   and ibuprofen) for up to 8 doses.       Test Results Pending At Discharge  Pending Labs       Order Current Status    Blood Culture Preliminary result    Blood Culture Preliminary result            Hospital Course   Patient is a 69-year-old man with a past medical history of CAD status post PCI, BPH, GERD and hypertension who presented on 1-30 for left-sided flank pain, found to have obstructing kidney stone and admitted to general medicine. Rigors at home, obstructing 7 mm calculi noted on imaging.  Broad spec abx given. UA grew E. Faecalis. Urology service performed cystoscopy with attempted stent placement,  ultimately left nephrostomy tube placed. Discharged to home on PO antibiotics per urinalysis sensitivities with close OP follow up with both PCP and urology for possible stent placement.    Pertinent Physical Exam At Time of Discharge  Physical Exam  Constitutional: No respiratory acute distress, cooperative, answers questions appropriately, obese  Eyes: EOMI, clear sclera  ENMT: mucous membranes moist  Head/Neck: Neck supple, Trachea midline  Respiratory/Thorax: CTAB, no wheezes  Cardiovascular:  distal pulses 2+, no edema, tachycardia  Gastrointestinal: non tender, no palpable masses, mildly distended  Musculoskeletal: ROM intact, no gross deformity  Neurological: No focal deficits, normal sensation  Psychological: Appropriate mood and behavior  Skin: Warm and dry, nephrostomy tube on left side with serosanguineous drainage.    Outpatient Follow-Up  Future Appointments   Date Time Provider Department Center   4/10/2025 10:30 AM Marino Scott MD [id number] [facility]         Leticia Reina MD

## 2025-02-02 NOTE — CARE PLAN
The patient's goals for the shift include  pt will remain safe and comfortable    The clinical goals for the shift include pt will remain HDS during shift    Problem: Pain - Adult  Goal: Verbalizes/displays adequate comfort level or baseline comfort level  Outcome: Progressing     Problem: Safety - Adult  Goal: Free from fall injury  Outcome: Progressing     Problem: Discharge Planning  Goal: Discharge to home or other facility with appropriate resources  Outcome: Progressing     Problem: Chronic Conditions and Co-morbidities  Goal: Patient's chronic conditions and co-morbidity symptoms are monitored and maintained or improved  Outcome: Progressing     Problem: Nutrition  Goal: Nutrient intake appropriate for maintaining nutritional needs  Outcome: Progressing     Problem: Pain  Goal: Turns in bed with improved pain control throughout the shift  Outcome: Progressing  Goal: Free from opioid side effects throughout the shift  Outcome: Progressing  Goal: Free from acute confusion related to pain meds throughout the shift  Outcome: Progressing

## 2025-02-02 NOTE — PROGRESS NOTES
"Jakob Austin is a 69 y.o. male on day 3 of admission presenting with Ureteral stone.    Subjective   No complaints aside from an orients due to nephrostomy tube       Objective     Physical Exam  Awake alert and oriented no acute distress patient is ambulating about the room.  Minimal pink-tinged to nephrostomy tube drainage  Last Recorded Vitals  Blood pressure 141/72, pulse 99, temperature 37.7 °C (99.9 °F), temperature source Temporal, resp. rate 16, height 1.753 m (5' 9\"), weight 93.9 kg (207 lb 0.2 oz), SpO2 93%.  Intake/Output last 3 Shifts:  I/O last 3 completed shifts:  In: 900 (9.6 mL/kg) [IV Piggyback:900]  Out: 1400 (14.9 mL/kg) [Urine:1400 (0.4 mL/kg/hr)]  Weight: 93.9 kg     Relevant Results  Scheduled medications  aspirin, 81 mg, oral, Daily  atorvastatin, 40 mg, oral, Daily  famotidine, 20 mg, oral, BID  heparin (porcine), 5,000 Units, subcutaneous, q8h  lisinopril, 5 mg, oral, Daily  meropenem, 1 g, intravenous, q8h  metoprolol succinate XL, 25 mg, oral, Daily  psyllium, 1 packet, oral, Daily  sennosides, 1 tablet, oral, BID      Continuous medications     PRN medications  PRN medications: acetaminophen, alum-mag hydroxide-simeth, ondansetron, polyethylene glycol    Results for orders placed or performed during the hospital encounter of 01/29/25 (from the past 24 hours)   Vitamin B12   Result Value Ref Range    Vitamin B12 440 211 - 911 pg/mL   Folate   Result Value Ref Range    Folate, Serum 11.0 >5.0 ng/mL   Transferrin   Result Value Ref Range    Transferrin 166 (L) 200 - 360 mg/dL   CBC   Result Value Ref Range    WBC 6.9 4.4 - 11.3 x10*3/uL    nRBC 0.0 0.0 - 0.0 /100 WBCs    RBC 3.71 (L) 4.50 - 5.90 x10*6/uL    Hemoglobin 11.0 (L) 13.5 - 17.5 g/dL    Hematocrit 33.2 (L) 41.0 - 52.0 %    MCV 90 80 - 100 fL    MCH 29.6 26.0 - 34.0 pg    MCHC 33.1 32.0 - 36.0 g/dL    RDW 13.7 11.5 - 14.5 %    Platelets 185 150 - 450 x10*3/uL   Basic Metabolic Panel   Result Value Ref Range    Glucose 101 (H) 74 - " 99 mg/dL    Sodium 142 136 - 145 mmol/L    Potassium 3.9 3.5 - 5.3 mmol/L    Chloride 105 98 - 107 mmol/L    Bicarbonate 27 21 - 32 mmol/L    Anion Gap 14 10 - 20 mmol/L    Urea Nitrogen 21 6 - 23 mg/dL    Creatinine 1.14 0.50 - 1.30 mg/dL    eGFR 69 >60 mL/min/1.73m*2    Calcium 8.6 8.6 - 10.3 mg/dL       XR abdomen 1 view    Result Date: 1/31/2025  Interpreted By:  Shmuel Fuller, STUDY: XR ABDOMEN 1 VIEW;  1/31/2025 12:10 pm   INDICATION: Signs/Symptoms:assess for opacity of left distal ureteral stone.     COMPARISON: None.   ACCESSION NUMBER(S): [id number]   ORDERING CLINICIAN: RENÉ COKER   FINDINGS: Nonobstructive bowel gas pattern. Limited evaluation of pneumoperitoneum on supine imaging, however no gross evidence of free air is noted. Left-sided percutaneous nephrostomy tube noted. Left pelvic density measuring 0.4 cm might correlate with the known distal ureteric calculus evident in prior CT scan.   Visualized lungs are clear.   Osseous structures demonstrate no acute bony changes.       1. Left-sided percutaneous nephrostomy tube noted. Left pelvic density measuring 0.4 cm might correlate with the known distal ureteric calculus evident in prior CT scan.   MACRO: None   Signed by: Shmuel Fuller 1/31/2025 12:43 PM Dictation workstation:   [id number]    IR placement of nephrostomy catheter    Result Date: 1/30/2025  Interpreted By:  Yung Vasquez, STUDY: IR  NEPHROSTOMY PLACEMENT; 1/30/2025 3:15 pm   INDICATION: Signs/Symptoms:Patient needs left percutaneous nephrostomy tube.   COMPARISON: None.   ACCESSION NUMBER(S): [id number]   ORDERING CLINICIAN: NAOMI LEVIN   TECHNIQUE: LEFT-SIDED PERCUTANEOUS NEPHROSTOMY DRAINAGE CATHETER PLACEMENT ANTEGRADE PYELOGRAM DIRECT ULTRASOUND AND FLUOROSCOPIC GUIDANCE   The procedure and potential complications including the benefits, risks, and alternatives were discussed with the patient. Voluntary signed informed consent was obtained. The patient and medical  history were assessed by the vascular interventional radiology team.   No conscious sedation risks or contraindications were identified. Moderate sedation was administered by a dedicated angiography nurse with Fentanyl  50 mcg  IV. Continuous cardiopulmonary monitoring in addition to serial vital signs were performed.   The total fluoroscopy time was  1.2 minutes. 14.6 mGy 39304 mGycm2   In the prone position, the patient was positioned on the angiography table. The cutaneous tissues of the  left flank were prepared and draped in usual sterile manner. Screening evaluation of the  left kidney was performed for direct ultrasound guidance.  A posterior calyx in the inferior pole of the kidney was identified and targeted. The optimal access site and tract were locally anesthetized with subcutaneous Lidocaine 1% instillation. Utilizing direct ultrasound guidance, the targeted calyx was accessed with a 22-gauge Chiba needle. After confirmation of location, a digital spot ultrasound image was acquired.   There was return of  non-purulent urine through the access needle. The collecting system was decompressed with aspiration. The calyceal system and central renal pelvis were minimally distended with dilute contrast to facilitate antegrade access and fluoroscopic guidance. An antegrade pyelogram was performed.   FINDINGS: ANTEGRADE PYELOGRAM FINDINGS: Moderate left hydroureteronephrosis. Obstructing stone noted in the left distal ureter.   PERCUTANEOUS NEPHROSTOMY DRAINAGE CATHETER PLACEMENT: A 018 Fertile-Mandrill guidewire was inserted through the access needle. Utilizing guidewire manipulation, access into the Field  proximal ureter was obtained. With the guidewire left in place to secure access, a 7-Danish coaxial dilator sheath system was placed. The sheath was advanced into the central renal pelvis. A 035  stiff Glide guidewire was inserted through the access sheath into the  proximal ureter to secure access.   There was  subsequent placement of  a 10-Malaysian nephrostomy drainage catheter over the 035 guidewire. The pigtail was formed in the central renal pelvis.  Diluted contrast injection was performed to confirm optimal location. The external portions of the catheter were secured with sterile suture and dressing.   The patient tolerated the procedure without complication.       1. Uncomplicated and technically successful  10-Malaysian percutaneous nephrostomy tube placement -  left kidney.  The catheter was connected to external gravity drainage. 2. Return and identification of  non-purulent urine.     I was present for and/or performed the critical portions of the procedure and immediately available throughout the entire procedure. I personally reviewed the image(s) / study and resident interpretation. I agree with the findings as stated. Dictated at [facility].   Signed by: Yung Vasquez 1/30/2025 4:43 PM Dictation workstation:   [id number]    FL less than 1 hour    Result Date: 1/30/2025  These images are not reportable by radiology and will not be interpreted by  Radiologists.    CT abdomen pelvis w IV contrast    Result Date: 1/29/2025  Interpreted By:  Mil Beltre, STUDY: CT ABDOMEN PELVIS W IV CONTRAST;  1/29/2025 10:16 pm   INDICATION: Signs/Symptoms:Abdominal pain, fever, tachycardia.   COMPARISON: CT scan of the abdomen pelvis 06/11/2020.   ACCESSION NUMBER(S): [id number]   ORDERING CLINICIAN: EVON HU   TECHNIQUE: Axial CT images of the abdomen and pelvis with coronal and sagittal reconstructed images obtained after intravenous administration of 75 mL of Omnipaque 350   FINDINGS: LOWER CHEST: Emphysematous changes noted in the lung bases. Bibasilar atelectasis and or scarring. Coronary artery calcifications noted. Moderate-size hiatal hernia.   ABDOMEN:   LIVER: Normal morphology. Liver is hypodense relative to the spleen which can be seen in the setting of  steatosis. BILE DUCTS: Normal caliber. GALLBLADDER: No calcified gallstones. No wall thickening. PANCREAS: Within normal limits. SPLEEN: Splenomegaly measuring 16 cm in craniocaudal dimension. ADRENALS: Nodular thickening of the adrenal glands may relate to hyperplasia or adenomatous change. KIDNEYS and URETERS: There is delayed contrast enhancement of the left kidney relative to the right. There is left-sided hydronephrosis and hydroureter which extends to the level of the distal ureter where an obstructing 7 mm calculus is noted just proximal to the left UVJ. There is left perinephric inflammatory stranding with peripheral wedge-shaped areas of hypoattenuation which raises concern for developing pyelonephritis. Right kidney is normal size without evidence for calculi, hydronephrosis, hydroureter or perinephric inflammatory changes. Subcentimeter hypodense foci bilaterally are too small to characterize.   VESSELS:  Calcific atherosclerosis of the aortoiliac vessels. No aortic aneurysm. RETROPERITONEUM: No pathologically enlarged retroperitoneal lymph nodes.   PELVIS:   REPRODUCTIVE ORGANS: Central prostatic calcifications noted. Prostate gland protrudes into the base of the bladder. BLADDER: Bladder is underdistended with mild wall thickening.   BOWEL: Moderate-size hiatal hernia. Stomach is underdistended. Visualized loops of bowel are without evidence for obstruction. Scattered colonic diverticula without evidence for acute diverticulitis. No pneumatosis or portal venous gas.  Normal appendix. PERITONEUM: No ascites or free air, no fluid collection.   ABDOMINAL WALL: Patulous inguinal canals containing fat. Small umbilical hernia contains fat. BONES: Multilevel degenerative changes of the spine.       Left-sided hydronephrosis and hydroureter extending to the distal ureter where an obstructing 7 mm calculus is identified just proximal to the UVJ. There is delayed contrast enhancement of the left kidney with  perinephric inflammatory stranding. There subtle peripheral wedge-shaped areas of hypoattenuation in the left kidney which raises concern for developing pyelonephritis. Correlate with urinalysis.   Diffuse hepatic steatosis.   Stable splenomegaly.   Diverticulosis without evidence for acute diverticulitis.   Additional findings as described above.     MACRO: None   Signed by: Mil Beltre 1/29/2025 11:11 PM Dictation workstation:   [id number]           This patient currently has cardiac telemetry ordered; if you would like to modify or discontinue the telemetry order, click here to go to the orders activity to modify/discontinue the order.                 Assessment/Plan   Assessment & Plan  Ureteral stone    Sepsis (Multi)    Calculus of ureter    Patient is doing well following nephrostomy tube placement after failed attempt at placement of a retrograde double-J stent.  Home with appropriate oral antibiotics.  Follow-up with Dr. Agarwal 1 to 2 weeks to schedule for definitive stone treatment.  Stone is readily visible on KUB.       I spent 10 minutes in the professional and overall care of this patient.      Akin José MD

## 2025-02-03 LAB
ATRIAL RATE: 128 BPM
BACTERIA BLD CULT: NORMAL
BACTERIA BLD CULT: NORMAL
P AXIS: 91 DEGREES
PR INTERVAL: 150 MS
Q ONSET: 249 MS
QRS COUNT: 21 BEATS
QRS DURATION: 94 MS
QT INTERVAL: 293 MS
QTC CALCULATION(BAZETT): 428 MS
QTC FREDERICIA: 377 MS
R AXIS: 67 DEGREES
T AXIS: -5 DEGREES
T OFFSET: 396 MS
VENTRICULAR RATE: 128 BPM

## 2025-02-04 ENCOUNTER — PATIENT OUTREACH (OUTPATIENT)
Dept: PRIMARY CARE | Facility: CLINIC | Age: 70
End: 2025-02-04
Payer: MEDICARE

## 2025-02-04 NOTE — PROGRESS NOTES
Discharge Facility: [facility]  Discharge Diagnosis: ureteral stone  Admission Date:  1/29/25  Discharge Date: 2/2/25    PCP Appointment Date: TBD  Specialist Appointment Date: TBD urology  Hospital Encounter and Summary Linked: Yes  Discharge Summary by Leticia Reina MD (02/02/2025 10:19)   Two attempts were made to reach patient within two business days after discharge. Voicemail left with contact information for patient to call back with any non-emergent questions or concerns.

## 2025-02-10 ENCOUNTER — HOSPITAL ENCOUNTER (OUTPATIENT)
Dept: RADIOLOGY | Facility: HOSPITAL | Age: 70
Discharge: HOME | End: 2025-02-10
Payer: MEDICARE

## 2025-02-10 VITALS
HEIGHT: 69 IN | OXYGEN SATURATION: 94 % | TEMPERATURE: 98.2 F | RESPIRATION RATE: 18 BRPM | HEART RATE: 68 BPM | DIASTOLIC BLOOD PRESSURE: 76 MMHG | SYSTOLIC BLOOD PRESSURE: 140 MMHG | BODY MASS INDEX: 30.57 KG/M2

## 2025-02-10 DIAGNOSIS — N20.1 CALCULUS OF URETER: ICD-10-CM

## 2025-02-10 PROCEDURE — 99152 MOD SED SAME PHYS/QHP 5/>YRS: CPT

## 2025-02-10 PROCEDURE — C1725 CATH, TRANSLUMIN NON-LASER: HCPCS

## 2025-02-10 PROCEDURE — 2720000007 HC OR 272 NO HCPCS

## 2025-02-10 PROCEDURE — 50434 CONVERT NEPHROSTOMY CATHETER: CPT

## 2025-02-10 PROCEDURE — 99153 MOD SED SAME PHYS/QHP EA: CPT

## 2025-02-10 PROCEDURE — 99152 MOD SED SAME PHYS/QHP 5/>YRS: CPT | Performed by: RADIOLOGY

## 2025-02-10 PROCEDURE — 7100000009 HC PHASE TWO TIME - INITIAL BASE CHARGE

## 2025-02-10 PROCEDURE — 7100000010 HC PHASE TWO TIME - EACH INCREMENTAL 1 MINUTE

## 2025-02-10 PROCEDURE — 2550000001 HC RX 255 CONTRASTS: Performed by: UROLOGY

## 2025-02-10 PROCEDURE — C2617 STENT, NON-COR, TEM W/O DEL: HCPCS

## 2025-02-10 PROCEDURE — C1887 CATHETER, GUIDING: HCPCS

## 2025-02-10 PROCEDURE — C1769 GUIDE WIRE: HCPCS

## 2025-02-10 PROCEDURE — 50435 EXCHANGE NEPHROSTOMY CATH: CPT | Mod: LT

## 2025-02-10 PROCEDURE — 99153 MOD SED SAME PHYS/QHP EA: CPT | Performed by: RADIOLOGY

## 2025-02-10 PROCEDURE — C1729 CATH, DRAINAGE: HCPCS

## 2025-02-10 PROCEDURE — 2500000005 HC RX 250 GENERAL PHARMACY W/O HCPCS: Performed by: RADIOLOGY

## 2025-02-10 PROCEDURE — 2500000004 HC RX 250 GENERAL PHARMACY W/ HCPCS (ALT 636 FOR OP/ED): Performed by: RADIOLOGY

## 2025-02-10 PROCEDURE — C1892 INTRO/SHEATH,FIXED,PEEL-AWAY: HCPCS

## 2025-02-10 PROCEDURE — 2780000003 HC OR 278 NO HCPCS

## 2025-02-10 PROCEDURE — 50431 NJX PX NFROSGRM &/URTRGRM: CPT

## 2025-02-10 RX ORDER — FENTANYL CITRATE 50 UG/ML
INJECTION, SOLUTION INTRAMUSCULAR; INTRAVENOUS
Status: COMPLETED | OUTPATIENT
Start: 2025-02-10 | End: 2025-02-10

## 2025-02-10 RX ORDER — MIDAZOLAM HYDROCHLORIDE 1 MG/ML
INJECTION INTRAMUSCULAR; INTRAVENOUS
Status: COMPLETED | OUTPATIENT
Start: 2025-02-10 | End: 2025-02-10

## 2025-02-10 RX ORDER — CIPROFLOXACIN 2 MG/ML
400 INJECTION, SOLUTION INTRAVENOUS ONCE
Status: COMPLETED | OUTPATIENT
Start: 2025-02-10 | End: 2025-02-10

## 2025-02-10 RX ORDER — LIDOCAINE HYDROCHLORIDE 10 MG/ML
INJECTION, SOLUTION EPIDURAL; INFILTRATION; INTRACAUDAL; PERINEURAL
Status: COMPLETED | OUTPATIENT
Start: 2025-02-10 | End: 2025-02-10

## 2025-02-10 RX ADMIN — IOHEXOL 350 ML: 350 INJECTION, SOLUTION INTRAVENOUS at 11:46

## 2025-02-10 RX ADMIN — MIDAZOLAM HYDROCHLORIDE 1 MG: 1 INJECTION, SOLUTION INTRAMUSCULAR; INTRAVENOUS at 10:48

## 2025-02-10 RX ADMIN — MIDAZOLAM HYDROCHLORIDE 1 MG: 1 INJECTION, SOLUTION INTRAMUSCULAR; INTRAVENOUS at 11:01

## 2025-02-10 RX ADMIN — CIPROFLOXACIN 400 MG: 400 INJECTION, SOLUTION INTRAVENOUS at 10:41

## 2025-02-10 RX ADMIN — LIDOCAINE HYDROCHLORIDE 5 ML: 10 INJECTION, SOLUTION EPIDURAL; INFILTRATION; INTRACAUDAL; PERINEURAL at 11:27

## 2025-02-10 RX ADMIN — FENTANYL CITRATE 50 MCG: 50 INJECTION, SOLUTION INTRAMUSCULAR; INTRAVENOUS at 10:48

## 2025-02-10 RX ADMIN — LIDOCAINE HYDROCHLORIDE 5 ML: 10 INJECTION, SOLUTION EPIDURAL; INFILTRATION; INTRACAUDAL; PERINEURAL at 10:49

## 2025-02-10 RX ADMIN — MIDAZOLAM HYDROCHLORIDE 0.5 MG: 1 INJECTION, SOLUTION INTRAMUSCULAR; INTRAVENOUS at 11:13

## 2025-02-10 RX ADMIN — FENTANYL CITRATE 50 MCG: 50 INJECTION, SOLUTION INTRAMUSCULAR; INTRAVENOUS at 11:01

## 2025-02-10 RX ADMIN — FENTANYL CITRATE 50 MCG: 50 INJECTION, SOLUTION INTRAMUSCULAR; INTRAVENOUS at 11:13

## 2025-02-10 RX ADMIN — Medication 3 L/MIN: at 10:30

## 2025-02-10 ASSESSMENT — PAIN SCALES - GENERAL
PAINLEVEL_OUTOF10: 0 - NO PAIN
PAINLEVEL_OUTOF10: 2
PAINLEVEL_OUTOF10: 0 - NO PAIN
PAINLEVEL_OUTOF10: 3
PAINLEVEL_OUTOF10: 2

## 2025-02-10 ASSESSMENT — PAIN - FUNCTIONAL ASSESSMENT
PAIN_FUNCTIONAL_ASSESSMENT: 0-10

## 2025-02-10 NOTE — PRE-PROCEDURE NOTE
Interventional Radiology Preprocedure Note    Indication for procedure: The encounter diagnosis was Calculus of ureter.    Relevant review of systems: NA    Relevant Labs:   Lab Results   Component Value Date    CREATININE 1.14 02/02/2025    EGFR 69 02/02/2025    INR 1.4 (H) 01/30/2025    PROTIME 16.3 (H) 01/30/2025       Planned Sedation/Anesthesia: Moderate    Airway assessment: normal    Directed physical examination:    RRR, lungs CTA-B    Mallampati: II (hard and soft palate, upper portion of tonsils and uvula visible)    ASA Score: ASA 2 - Patient with mild systemic disease with no functional limitations    Benefits, risks and alternatives of procedure and planned sedation have been discussed with the patient and/or their representative. All questions answered and they agree to proceed.

## 2025-02-10 NOTE — Clinical Note
Procedure end.3x26 steent placed. 10 fr x25 cm neph tube exchange. Sutured with stat lock 4x4 tegaderm dressing dry and intact.

## 2025-02-10 NOTE — POST-PROCEDURE NOTE
Interventional Radiology Brief Postprocedure Note    Attending: Yung Vasquez MD    Assistant:   Staff Role   Samina Bell MT Radiology Technologist   Yung Vasquez MD Radiologist   Kelle Hopper, RN Radiology Nurse   Samson Diana MT Radiology Technologist       Diagnosis:   1. Calculus of ureter  IR stent placement    IR stent placement          Description of procedure: Left double J ureteral stent placement (8F x 26 cm) after angioplasty of distal ureter. Distal ureteral calculus remains in place. Left 10F nephrostomy tube was exchanged. Can be removed after lithotripsy.    Timeout:  Yes    Procedure Area: Procedure Area     Anesthesia:   Conscious Sedation    Complications: None    Estimated Blood Loss: minimal    Medications (Filter: Administrations occurring from 1035 to 1131 on 02/10/25) As of 02/10/25 1131      ciprofloxacin (Cipro) 400 mg in dextrose 5%  mL (mL/hr) Total volume:  Not documented*   *Total volume has not been documented. View each administration to see the amount administered.     Date/Time Rate/Dose/Volume Action       02/10/25  1041 400 mg - 200 mL/hr (over 60 min) New Bag               lidocaine PF (Xylocaine) 10 mg/mL (1 %) injection (mL) Total volume:  10 mL      Date/Time Rate/Dose/Volume Action       02/10/25  1049 5 mL Given      1127 5 mL Given               fentaNYL PF (Sublimaze) injection (mcg) Total dose:  150 mcg      Date/Time Rate/Dose/Volume Action       02/10/25  1048 50 mcg Given      1101 50 mcg Given      1113 50 mcg Given               midazolam (Versed) injection (mg) Total dose:  2.5 mg      Date/Time Rate/Dose/Volume Action       02/10/25  1048 1 mg Given      1101 1 mg Given      1113 0.5 mg Given                   No specimens collected      See detailed result report with images in PACS.    The patient tolerated the procedure well without incident or complication and is in stable condition.

## 2025-02-11 LAB
ATRIAL RATE: 128 BPM
P AXIS: 91 DEGREES
PR INTERVAL: 150 MS
Q ONSET: 249 MS
QRS COUNT: 21 BEATS
QRS DURATION: 94 MS
QT INTERVAL: 293 MS
QTC CALCULATION(BAZETT): 428 MS
QTC FREDERICIA: 377 MS
R AXIS: 67 DEGREES
T AXIS: -5 DEGREES
T OFFSET: 396 MS
VENTRICULAR RATE: 128 BPM

## 2025-02-11 ASSESSMENT — PAIN SCALES - GENERAL: PAINLEVEL_OUTOF10: 0 - NO PAIN

## 2025-02-18 ENCOUNTER — PATIENT OUTREACH (OUTPATIENT)
Dept: PRIMARY CARE | Facility: CLINIC | Age: 70
End: 2025-02-18
Payer: MEDICARE

## 2025-02-18 NOTE — PROGRESS NOTES
Call regarding follow up from hospital stay. Declines Dr Miller appt at this time. Had lithotripsy today and feeling much better  At time of outreach call the patient feels as if their condition has improved since last visit.  Reviewed follow up with urology and post hospital care. No other concerns.

## 2025-02-20 DIAGNOSIS — I10 BENIGN ESSENTIAL HYPERTENSION: ICD-10-CM

## 2025-02-20 DIAGNOSIS — I49.3 PVC'S (PREMATURE VENTRICULAR CONTRACTIONS): ICD-10-CM

## 2025-02-20 DIAGNOSIS — E78.5 DYSLIPIDEMIA: Primary | ICD-10-CM

## 2025-02-20 DIAGNOSIS — E78.5 DYSLIPIDEMIA: ICD-10-CM

## 2025-02-20 DIAGNOSIS — Z95.5 PRESENCE OF STENT IN LEFT CIRCUMFLEX CORONARY ARTERY: ICD-10-CM

## 2025-02-20 DIAGNOSIS — I25.10 CORONARY ARTERY DISEASE INVOLVING NATIVE CORONARY ARTERY OF NATIVE HEART WITHOUT ANGINA PECTORIS: ICD-10-CM

## 2025-02-20 RX ORDER — ATORVASTATIN CALCIUM 40 MG/1
40 TABLET, FILM COATED ORAL DAILY
Qty: 15 TABLET | Refills: 0 | Status: SHIPPED | OUTPATIENT
Start: 2025-02-20 | End: 2025-02-21 | Stop reason: SDUPTHER

## 2025-02-20 NOTE — TELEPHONE ENCOUNTER
Rx Refill Request Telephone Encounter  Wants all meds to mail order-[facility]  Name:  Jakob Austin DOB:  [id number]  Medication Name:  atorvastatin  40 mg          Specific Pharmacy location:  [facility] [phone]  Date of last appointment:  10/10/24  Date of next appointment:  n/a  Best number to reach patient:  [phone]  Rx Refill Request Telephone Encounter    Name:  Jakob Austin DOB:  [id number]  Medication Name:  metoprolol   \25 mg          Specific Pharmacy location:  [phone]  Date of last appointment:  10/10/24  Date of next appointment:  n/a  Best number to reach patient:  [phone] Refill Request Telephone Encounter    Name:  Jakob Austin  :  [id number]  Medication Name:  lisinopril  5 mg          Specific Pharmacy location:  [facility] [phone]  Date of last appointment:  10/10/24  Date of next appointment:  n/a  Best number to reach patient:  [phone]    Ty

## 2025-02-20 NOTE — TELEPHONE ENCOUNTER
Pt has been out of atorvastatin.  Can you please send 2 week supply to  [phone]?  No allergies  Ty

## 2025-02-21 DIAGNOSIS — E78.5 DYSLIPIDEMIA: ICD-10-CM

## 2025-02-21 RX ORDER — ATORVASTATIN CALCIUM 40 MG/1
40 TABLET, FILM COATED ORAL DAILY
Qty: 90 TABLET | Refills: 3 | Status: SHIPPED | OUTPATIENT
Start: 2025-02-21 | End: 2025-02-26 | Stop reason: SDUPTHER

## 2025-02-26 DIAGNOSIS — E78.5 DYSLIPIDEMIA: ICD-10-CM

## 2025-02-27 RX ORDER — ATORVASTATIN CALCIUM 40 MG/1
40 TABLET, FILM COATED ORAL DAILY
Qty: 90 TABLET | Refills: 3 | Status: SHIPPED | OUTPATIENT
Start: 2025-02-27 | End: 2026-02-27

## 2025-03-03 ENCOUNTER — TELEPHONE (OUTPATIENT)
Dept: PRIMARY CARE | Facility: CLINIC | Age: 70
End: 2025-03-03
Payer: MEDICARE

## 2025-03-03 RX ORDER — METOPROLOL SUCCINATE 25 MG/1
25 TABLET, EXTENDED RELEASE ORAL DAILY
Qty: 90 TABLET | Refills: 3 | Status: SHIPPED | OUTPATIENT
Start: 2025-03-03 | End: 2026-03-03

## 2025-03-03 RX ORDER — LISINOPRIL 5 MG/1
5 TABLET ORAL DAILY
Qty: 100 TABLET | Refills: 3 | Status: SHIPPED | OUTPATIENT
Start: 2025-03-03

## 2025-03-03 NOTE — TELEPHONE ENCOUNTER
Patient called a few weeks ago requesting atorvastatin( it wont left me route it to you because you recently called it into [facility]), lisinopril, metoprolol and omeprazole 40mg(not on his list but I do see it in the past on him med list). Please send all to [facility]. Ty

## 2025-03-03 NOTE — TELEPHONE ENCOUNTER
Jakob only picked up 15 tablets of atorvastatin from [facility]. He does need that rx sent to [facility] as well. Ty

## 2025-03-04 DIAGNOSIS — E78.5 DYSLIPIDEMIA: ICD-10-CM

## 2025-03-04 RX ORDER — ATORVASTATIN CALCIUM 40 MG/1
40 TABLET, FILM COATED ORAL DAILY
Qty: 90 TABLET | Refills: 3 | Status: SHIPPED | OUTPATIENT
Start: 2025-03-04 | End: 2026-03-04

## 2025-03-21 PROBLEM — E66.811 CLASS 1 OBESITY WITH BODY MASS INDEX (BMI) OF 30.0 TO 30.9 IN ADULT: Status: ACTIVE | Noted: 2024-01-30

## 2025-04-10 ENCOUNTER — APPOINTMENT (OUTPATIENT)
Dept: CARDIOLOGY | Facility: CLINIC | Age: 70
End: 2025-04-10
Payer: MEDICARE

## (undated) DEVICE — Device

## (undated) DEVICE — CATHETER, URETERAL, WHISTLE TIP, 5 FR, 115 CM, POLYURETHANE

## (undated) DEVICE — DRESSING, TRANSPARENT, TEGADERM, 2-3/8 X 2-3/4 IN

## (undated) DEVICE — OBTURATOR, BLADELESS , SU

## (undated) DEVICE — TUBING SET, BIFURCATED, SMOKE EVAC, FILTERED, F/AIRSEAL

## (undated) DEVICE — DRAPE, COLUMN, DAVINCI XI

## (undated) DEVICE — FORCEPS, PROGRASP, DAVINCI XI

## (undated) DEVICE — ADAPTER, UROLOK

## (undated) DEVICE — COVER, TIP HOT SHEARS ENDOWRIST

## (undated) DEVICE — SOLUTION, INJECTION, CONTRAST, OMNIPAQUE 240MG 50ML, PLUS PAK

## (undated) DEVICE — GUIDEWIRE, STRAIGHT TIP, .038 DIA, 150 CM, 3 CM TIP"

## (undated) DEVICE — SLEEVE, VASO PRESS, CALF GARMENT, MEDIUM, GREEN

## (undated) DEVICE — GUIDEWIRE, ANGLE TIP, .032 DIA, 150 CM, 3 CM TIP"

## (undated) DEVICE — SEAL, UNIVERSAL, 5-12MM

## (undated) DEVICE — STRIP, SKIN CLOSURE, STERI STRIP, REINFORCED, 0.5 X 4 IN

## (undated) DEVICE — TROCAR, KII OPTICAL BLADELESS 5MM Z THREAD 100MM LNGTH

## (undated) DEVICE — GRASPER TIP, MODIFIED RAPTOR, 5MM, DISP

## (undated) DEVICE — SYRINGE, 20 CC, LUER SLIP

## (undated) DEVICE — ENDOSCOPE, ASCOPE 4 CYSCO, REVERSE DEFLECTION

## (undated) DEVICE — GOWN, ASTOUND, XL

## (undated) DEVICE — CATHETER, IV, ANGIOCATH, 12 G X 3 IN, FEP POLYMER

## (undated) DEVICE — SCISSORS, ROUND TIP, DAVINCI XI

## (undated) DEVICE — DRESSING, TELFA, 3X4

## (undated) DEVICE — HOLSTER, ELECTROSURGERY ACCESSORY, STERILE

## (undated) DEVICE — DRAPE, SHEET, THREE QUARTER, FAN FOLD, 57 X 77 IN

## (undated) DEVICE — DRAPE, ARM XI

## (undated) DEVICE — STENT, URETERAL, INLAY, 6FR X 26CM, W/O GUIDEWIRE

## (undated) DEVICE — CATHETER TRAY, SURESTEP, 16FR, URINE METER W/STATLOCK

## (undated) DEVICE — SYRINGE, 10 CC, SLIP TIP

## (undated) DEVICE — COLLECTION BAG, FLUID, NON-STERILE

## (undated) DEVICE — BANDAGE, COFLEX, 6 X 5 YDS, FOAM TAN, STERILE, LF

## (undated) DEVICE — SUTURE, STRATAFIX, SPIRAL PDS PLUS, 2-0, 23CM, CT-2 VIOLET

## (undated) DEVICE — CARE KIT, LAPAROSCOPIC, ADVANCED

## (undated) DEVICE — DRIVER, NEEDLE, MEGA SUTURE CUT, DAVINCI XI